# Patient Record
Sex: FEMALE | Race: BLACK OR AFRICAN AMERICAN | Employment: OTHER
[De-identification: names, ages, dates, MRNs, and addresses within clinical notes are randomized per-mention and may not be internally consistent; named-entity substitution may affect disease eponyms.]

---

## 2017-01-12 ENCOUNTER — OFFICE VISIT (OUTPATIENT)
Dept: FAMILY MEDICINE CLINIC | Facility: CLINIC | Age: 72
End: 2017-01-12

## 2017-01-12 VITALS
HEIGHT: 63 IN | WEIGHT: 152.4 LBS | HEART RATE: 89 BPM | DIASTOLIC BLOOD PRESSURE: 93 MMHG | SYSTOLIC BLOOD PRESSURE: 165 MMHG | TEMPERATURE: 97.1 F | BODY MASS INDEX: 27 KG/M2

## 2017-01-12 DIAGNOSIS — I10 ESSENTIAL HYPERTENSION: ICD-10-CM

## 2017-01-12 DIAGNOSIS — E78.2 MIXED HYPERLIPIDEMIA: ICD-10-CM

## 2017-01-12 DIAGNOSIS — E11.65 TYPE 2 DIABETES MELLITUS WITH HYPERGLYCEMIA, WITHOUT LONG-TERM CURRENT USE OF INSULIN (HCC): Primary | ICD-10-CM

## 2017-01-12 PROCEDURE — 99214 OFFICE O/P EST MOD 30 MIN: CPT | Performed by: FAMILY MEDICINE

## 2017-01-12 RX ORDER — GLIPIZIDE 10 MG/1
10 TABLET ORAL
Qty: 60 TABLET | Refills: 3 | Status: ON HOLD | OUTPATIENT
Start: 2017-01-12 | End: 2018-01-01

## 2017-01-12 RX ORDER — LISINOPRIL 40 MG/1
40 TABLET ORAL DAILY
Qty: 30 TABLET | Refills: 3 | Status: ON HOLD | OUTPATIENT
Start: 2017-01-12 | End: 2018-01-01

## 2017-01-12 RX ORDER — ASPIRIN 81 MG/1
81 TABLET ORAL DAILY
Qty: 30 TABLET | Refills: 3 | Status: ON HOLD | OUTPATIENT
Start: 2017-01-12 | End: 2018-01-01

## 2017-01-12 RX ORDER — HYDROCHLOROTHIAZIDE 25 MG/1
25 TABLET ORAL DAILY
Qty: 30 TABLET | Refills: 3 | Status: ON HOLD | OUTPATIENT
Start: 2017-01-12 | End: 2018-01-01

## 2017-01-12 RX ORDER — ATORVASTATIN CALCIUM 40 MG/1
40 TABLET, FILM COATED ORAL DAILY
Qty: 30 TABLET | Refills: 3 | Status: ON HOLD | OUTPATIENT
Start: 2017-01-12 | End: 2018-01-01

## 2017-01-12 RX ORDER — LABETALOL 300 MG/1
300 TABLET, FILM COATED ORAL 2 TIMES DAILY
Qty: 60 TABLET | Refills: 3 | Status: ON HOLD | OUTPATIENT
Start: 2017-01-12 | End: 2018-01-01

## 2017-01-12 RX ORDER — VERAPAMIL HYDROCHLORIDE 240 MG/1
240 TABLET, FILM COATED, EXTENDED RELEASE ORAL 2 TIMES DAILY
Qty: 60 TABLET | Refills: 3 | Status: ON HOLD | OUTPATIENT
Start: 2017-01-12 | End: 2018-01-01

## 2017-01-30 ASSESSMENT — ENCOUNTER SYMPTOMS
ABDOMINAL PAIN: 0
SHORTNESS OF BREATH: 0
BACK PAIN: 0

## 2017-02-22 ENCOUNTER — OFFICE VISIT (OUTPATIENT)
Dept: FAMILY MEDICINE CLINIC | Facility: CLINIC | Age: 72
End: 2017-02-22

## 2017-02-22 DIAGNOSIS — Z23 ENCOUNTER FOR IMMUNIZATION: ICD-10-CM

## 2017-02-22 DIAGNOSIS — E55.9 VITAMIN D DEFICIENCY: ICD-10-CM

## 2017-02-22 DIAGNOSIS — Z13.1 DM (DIABETES MELLITUS SCREEN): ICD-10-CM

## 2017-02-22 DIAGNOSIS — Z00.00 HEALTHCARE MAINTENANCE: Primary | ICD-10-CM

## 2017-02-22 LAB — HBA1C MFR BLD: 9.1 %

## 2017-02-22 PROCEDURE — 99213 OFFICE O/P EST LOW 20 MIN: CPT | Performed by: HOSPITALIST

## 2017-02-22 PROCEDURE — G0008 ADMIN INFLUENZA VIRUS VAC: HCPCS | Performed by: HOSPITALIST

## 2017-02-22 PROCEDURE — 83036 HEMOGLOBIN GLYCOSYLATED A1C: CPT | Performed by: HOSPITALIST

## 2017-02-22 PROCEDURE — 90688 IIV4 VACCINE SPLT 0.5 ML IM: CPT | Performed by: HOSPITALIST

## 2017-02-22 ASSESSMENT — ENCOUNTER SYMPTOMS
BLURRED VISION: 0
EYE PAIN: 0
EYE DISCHARGE: 0

## 2017-02-23 VITALS
WEIGHT: 151.8 LBS | SYSTOLIC BLOOD PRESSURE: 163 MMHG | BODY MASS INDEX: 26.9 KG/M2 | TEMPERATURE: 97.9 F | DIASTOLIC BLOOD PRESSURE: 80 MMHG | HEIGHT: 63 IN | HEART RATE: 86 BPM

## 2018-01-01 ENCOUNTER — TELEPHONE (OUTPATIENT)
Dept: ONCOLOGY | Age: 73
End: 2018-01-01

## 2018-01-01 ENCOUNTER — APPOINTMENT (OUTPATIENT)
Dept: CT IMAGING | Age: 73
DRG: 746 | End: 2018-01-01
Payer: MEDICARE

## 2018-01-01 ENCOUNTER — APPOINTMENT (OUTPATIENT)
Dept: GENERAL RADIOLOGY | Age: 73
DRG: 754 | End: 2018-01-01
Payer: MEDICARE

## 2018-01-01 ENCOUNTER — HOSPITAL ENCOUNTER (OUTPATIENT)
Facility: MEDICAL CENTER | Age: 73
Discharge: HOME | End: 2018-07-02
Payer: MEDICARE

## 2018-01-01 ENCOUNTER — HOSPITAL ENCOUNTER (INPATIENT)
Age: 73
LOS: 1 days | Discharge: HOSPICE | DRG: 951 | End: 2018-07-13
Attending: INTERNAL MEDICINE | Admitting: INTERNAL MEDICINE
Payer: COMMERCIAL

## 2018-01-01 ENCOUNTER — APPOINTMENT (OUTPATIENT)
Dept: ULTRASOUND IMAGING | Age: 73
DRG: 746 | End: 2018-01-01
Payer: MEDICARE

## 2018-01-01 ENCOUNTER — HOSPITAL ENCOUNTER (INPATIENT)
Age: 73
LOS: 11 days | Discharge: HOME | DRG: 746 | End: 2018-06-27
Attending: EMERGENCY MEDICINE | Admitting: INTERNAL MEDICINE
Payer: MEDICARE

## 2018-01-01 ENCOUNTER — SURG/PROC ORDERS (OUTPATIENT)
Dept: GYNECOLOGIC ONCOLOGY | Age: 73
End: 2018-01-01

## 2018-01-01 ENCOUNTER — HOSPITAL ENCOUNTER (INPATIENT)
Age: 73
LOS: 3 days | Discharge: HOSPICE | DRG: 754 | End: 2018-07-12
Attending: EMERGENCY MEDICINE | Admitting: INTERNAL MEDICINE
Payer: MEDICARE

## 2018-01-01 ENCOUNTER — HOSPITAL ENCOUNTER (OUTPATIENT)
Dept: NUCLEAR MEDICINE | Age: 73
Discharge: HOME | End: 2018-07-04
Payer: MEDICARE

## 2018-01-01 ENCOUNTER — APPOINTMENT (OUTPATIENT)
Dept: MRI IMAGING | Age: 73
DRG: 746 | End: 2018-01-01
Payer: MEDICARE

## 2018-01-01 ENCOUNTER — HOSPITAL ENCOUNTER (OUTPATIENT)
Facility: MEDICAL CENTER | Age: 73
End: 2018-01-01
Payer: MEDICARE

## 2018-01-01 ENCOUNTER — ANESTHESIA (OUTPATIENT)
Dept: OPERATING ROOM | Age: 73
DRG: 746 | End: 2018-01-01
Payer: MEDICARE

## 2018-01-01 ENCOUNTER — ANESTHESIA EVENT (OUTPATIENT)
Dept: OPERATING ROOM | Age: 73
DRG: 746 | End: 2018-01-01
Payer: MEDICARE

## 2018-01-01 ENCOUNTER — APPOINTMENT (OUTPATIENT)
Dept: GENERAL RADIOLOGY | Age: 73
DRG: 746 | End: 2018-01-01
Payer: MEDICARE

## 2018-01-01 ENCOUNTER — APPOINTMENT (OUTPATIENT)
Dept: CT IMAGING | Age: 73
DRG: 754 | End: 2018-01-01
Payer: MEDICARE

## 2018-01-01 ENCOUNTER — HOSPITAL ENCOUNTER (OUTPATIENT)
Dept: NUCLEAR MEDICINE | Age: 73
Discharge: HOME | End: 2018-07-08
Payer: MEDICARE

## 2018-01-01 ENCOUNTER — TELEPHONE (OUTPATIENT)
Dept: INFUSION THERAPY | Facility: MEDICAL CENTER | Age: 73
End: 2018-01-01

## 2018-01-01 ENCOUNTER — HOSPITAL ENCOUNTER (OUTPATIENT)
Dept: RADIATION ONCOLOGY | Facility: MEDICAL CENTER | Age: 73
Discharge: HOME | End: 2018-06-29
Payer: MEDICARE

## 2018-01-01 VITALS
DIASTOLIC BLOOD PRESSURE: 67 MMHG | HEART RATE: 112 BPM | BODY MASS INDEX: 23.91 KG/M2 | OXYGEN SATURATION: 97 % | WEIGHT: 134.92 LBS | RESPIRATION RATE: 22 BRPM | HEIGHT: 63 IN | SYSTOLIC BLOOD PRESSURE: 138 MMHG | TEMPERATURE: 98.8 F

## 2018-01-01 VITALS
HEIGHT: 62 IN | DIASTOLIC BLOOD PRESSURE: 46 MMHG | WEIGHT: 122.2 LBS | OXYGEN SATURATION: 95 % | SYSTOLIC BLOOD PRESSURE: 92 MMHG | TEMPERATURE: 98.5 F | BODY MASS INDEX: 22.49 KG/M2 | HEART RATE: 61 BPM | RESPIRATION RATE: 16 BRPM

## 2018-01-01 VITALS
OXYGEN SATURATION: 89 % | SYSTOLIC BLOOD PRESSURE: 158 MMHG | TEMPERATURE: 97.9 F | DIASTOLIC BLOOD PRESSURE: 78 MMHG | RESPIRATION RATE: 14 BRPM | HEART RATE: 114 BPM

## 2018-01-01 VITALS — DIASTOLIC BLOOD PRESSURE: 61 MMHG | SYSTOLIC BLOOD PRESSURE: 133 MMHG | OXYGEN SATURATION: 100 % | TEMPERATURE: 97.7 F

## 2018-01-01 DIAGNOSIS — R19.00 PELVIC MASS: ICD-10-CM

## 2018-01-01 DIAGNOSIS — C57.8 MALIGNANT NEOPLASM OF OVERLAPPING SITES OF FEMALE GENITAL ORGANS (HCC): ICD-10-CM

## 2018-01-01 DIAGNOSIS — R10.30 LOWER ABDOMINAL PAIN: Primary | ICD-10-CM

## 2018-01-01 DIAGNOSIS — D49.59 UTERINE NEOPLASM: ICD-10-CM

## 2018-01-01 DIAGNOSIS — D64.9 LOW HEMOGLOBIN: Primary | ICD-10-CM

## 2018-01-01 DIAGNOSIS — N73.9 PELVIC ABSCESS IN FEMALE: ICD-10-CM

## 2018-01-01 DIAGNOSIS — R53.81 PHYSICAL DECONDITIONING: ICD-10-CM

## 2018-01-01 DIAGNOSIS — R19.00 ABDOMINAL MASS, UNSPECIFIED ABDOMINAL LOCATION: ICD-10-CM

## 2018-01-01 DIAGNOSIS — N30.00 ACUTE CYSTITIS WITHOUT HEMATURIA: ICD-10-CM

## 2018-01-01 DIAGNOSIS — E87.1 HYPONATREMIA: ICD-10-CM

## 2018-01-01 DIAGNOSIS — D49.59 UTERINE NEOPLASM: Primary | ICD-10-CM

## 2018-01-01 DIAGNOSIS — N93.9 VAGINAL BLEEDING: ICD-10-CM

## 2018-01-01 LAB
-: ABNORMAL
ABO/RH: NORMAL
ABSOLUTE EOS #: 0 K/UL (ref 0–0.4)
ABSOLUTE EOS #: 0 K/UL (ref 0–0.44)
ABSOLUTE EOS #: 0.03 K/UL (ref 0–0.44)
ABSOLUTE EOS #: 0.05 K/UL (ref 0–0.44)
ABSOLUTE EOS #: 0.09 K/UL (ref 0–0.44)
ABSOLUTE EOS #: <0.03 K/UL (ref 0–0.44)
ABSOLUTE IMMATURE GRANULOCYTE: 0 K/UL (ref 0–0.3)
ABSOLUTE IMMATURE GRANULOCYTE: 0.1 K/UL (ref 0–0.3)
ABSOLUTE IMMATURE GRANULOCYTE: 0.11 K/UL (ref 0–0.3)
ABSOLUTE IMMATURE GRANULOCYTE: 0.16 K/UL (ref 0–0.3)
ABSOLUTE IMMATURE GRANULOCYTE: 0.16 K/UL (ref 0–0.3)
ABSOLUTE IMMATURE GRANULOCYTE: 0.17 K/UL (ref 0–0.3)
ABSOLUTE IMMATURE GRANULOCYTE: 0.19 K/UL (ref 0–0.3)
ABSOLUTE IMMATURE GRANULOCYTE: 0.29 K/UL (ref 0–0.3)
ABSOLUTE IMMATURE GRANULOCYTE: 0.55 K/UL (ref 0–0.3)
ABSOLUTE IMMATURE GRANULOCYTE: 0.69 K/UL (ref 0–0.3)
ABSOLUTE LYMPH #: 1.35 K/UL (ref 1.1–3.7)
ABSOLUTE LYMPH #: 1.46 K/UL (ref 1.1–3.7)
ABSOLUTE LYMPH #: 1.6 K/UL (ref 1.1–3.7)
ABSOLUTE LYMPH #: 1.72 K/UL (ref 1–4.8)
ABSOLUTE LYMPH #: 2.33 K/UL (ref 1.1–3.7)
ABSOLUTE LYMPH #: 2.4 K/UL (ref 1.1–3.7)
ABSOLUTE LYMPH #: 2.4 K/UL (ref 1.1–3.7)
ABSOLUTE LYMPH #: 2.84 K/UL (ref 1.1–3.7)
ABSOLUTE LYMPH #: 4.41 K/UL (ref 1–4.8)
ABSOLUTE LYMPH #: 4.93 K/UL (ref 1–4.8)
ABSOLUTE MONO #: 0.52 K/UL (ref 0.1–0.8)
ABSOLUTE MONO #: 0.57 K/UL (ref 0.1–1.2)
ABSOLUTE MONO #: 0.59 K/UL (ref 0.1–0.8)
ABSOLUTE MONO #: 0.69 K/UL (ref 0.1–1.2)
ABSOLUTE MONO #: 0.82 K/UL (ref 0.1–0.8)
ABSOLUTE MONO #: 1.04 K/UL (ref 0.1–1.2)
ABSOLUTE MONO #: 1.09 K/UL (ref 0.1–1.2)
ABSOLUTE MONO #: 1.12 K/UL (ref 0.1–1.2)
ABSOLUTE MONO #: 1.15 K/UL (ref 0.1–1.2)
ABSOLUTE MONO #: 1.42 K/UL (ref 0.1–1.2)
ACTION: NORMAL
ALBUMIN SERPL-MCNC: 1.6 G/DL (ref 3.5–5.2)
ALBUMIN SERPL-MCNC: 2 G/DL (ref 3.5–5.2)
ALBUMIN SERPL-MCNC: 2.4 G/DL (ref 3.5–5.2)
ALBUMIN/GLOBULIN RATIO: 0.4 (ref 1–2.5)
ALBUMIN/GLOBULIN RATIO: 0.4 (ref 1–2.5)
ALBUMIN/GLOBULIN RATIO: 0.5 (ref 1–2.5)
ALLEN TEST: ABNORMAL
ALLEN TEST: ABNORMAL
ALP BLD-CCNC: 142 U/L (ref 35–104)
ALP BLD-CCNC: 168 U/L (ref 35–104)
ALP BLD-CCNC: 264 U/L (ref 35–104)
ALT SERPL-CCNC: 10 U/L (ref 5–33)
ALT SERPL-CCNC: 33 U/L (ref 5–33)
ALT SERPL-CCNC: 9 U/L (ref 5–33)
AMORPHOUS: ABNORMAL
ANION GAP SERPL CALCULATED.3IONS-SCNC: 10 MMOL/L (ref 9–17)
ANION GAP SERPL CALCULATED.3IONS-SCNC: 11 MMOL/L (ref 9–17)
ANION GAP SERPL CALCULATED.3IONS-SCNC: 13 MMOL/L (ref 9–17)
ANION GAP SERPL CALCULATED.3IONS-SCNC: 14 MMOL/L (ref 9–17)
ANION GAP SERPL CALCULATED.3IONS-SCNC: 15 MMOL/L (ref 9–17)
ANION GAP SERPL CALCULATED.3IONS-SCNC: 17 MMOL/L (ref 9–17)
ANION GAP SERPL CALCULATED.3IONS-SCNC: 8 MMOL/L (ref 9–17)
ANION GAP SERPL CALCULATED.3IONS-SCNC: 9 MMOL/L (ref 9–17)
ANION GAP: 14 MMOL/L (ref 7–16)
ANTIBODY SCREEN: NEGATIVE
ARM BAND NUMBER: NORMAL
AST SERPL-CCNC: 16 U/L
AST SERPL-CCNC: 18 U/L
AST SERPL-CCNC: 77 U/L
BACTERIA: ABNORMAL
BASOPHILS # BLD: 0 % (ref 0–2)
BASOPHILS ABSOLUTE: 0 K/UL (ref 0–0.2)
BASOPHILS ABSOLUTE: 0.04 K/UL (ref 0–0.2)
BASOPHILS ABSOLUTE: 0.04 K/UL (ref 0–0.2)
BASOPHILS ABSOLUTE: 0.05 K/UL (ref 0–0.2)
BASOPHILS ABSOLUTE: <0.03 K/UL (ref 0–0.2)
BILIRUB SERPL-MCNC: 0.43 MG/DL (ref 0.3–1.2)
BILIRUB SERPL-MCNC: 0.49 MG/DL (ref 0.3–1.2)
BILIRUB SERPL-MCNC: 0.85 MG/DL (ref 0.3–1.2)
BILIRUBIN URINE: NEGATIVE
BILIRUBIN URINE: NEGATIVE
BLD PROD TYP BPU: NORMAL
BLOOD BANK SPECIMEN: NORMAL
BLOOD BANK SPECIMEN: NORMAL
BUN BLDV-MCNC: 11 MG/DL (ref 8–23)
BUN BLDV-MCNC: 11 MG/DL (ref 8–23)
BUN BLDV-MCNC: 12 MG/DL (ref 8–23)
BUN BLDV-MCNC: 13 MG/DL (ref 8–23)
BUN BLDV-MCNC: 15 MG/DL (ref 8–23)
BUN BLDV-MCNC: 15 MG/DL (ref 8–23)
BUN BLDV-MCNC: 4 MG/DL (ref 8–23)
BUN BLDV-MCNC: 6 MG/DL (ref 8–23)
BUN BLDV-MCNC: 7 MG/DL (ref 8–23)
BUN BLDV-MCNC: 8 MG/DL (ref 8–23)
BUN BLDV-MCNC: 9 MG/DL (ref 8–23)
BUN BLDV-MCNC: 9 MG/DL (ref 8–23)
BUN/CREAT BLD: ABNORMAL (ref 9–20)
C TRACH DNA GENITAL QL NAA+PROBE: NEGATIVE
CA 125: 43 U/ML
CA 19-9: 121 U/ML (ref 0–35)
CALCIUM SERPL-MCNC: 7.7 MG/DL (ref 8.6–10.4)
CALCIUM SERPL-MCNC: 7.8 MG/DL (ref 8.6–10.4)
CALCIUM SERPL-MCNC: 7.9 MG/DL (ref 8.6–10.4)
CALCIUM SERPL-MCNC: 8 MG/DL (ref 8.6–10.4)
CALCIUM SERPL-MCNC: 8 MG/DL (ref 8.6–10.4)
CALCIUM SERPL-MCNC: 8.1 MG/DL (ref 8.6–10.4)
CALCIUM SERPL-MCNC: 8.2 MG/DL (ref 8.6–10.4)
CALCIUM SERPL-MCNC: 8.2 MG/DL (ref 8.6–10.4)
CALCIUM SERPL-MCNC: 8.5 MG/DL (ref 8.6–10.4)
CARCINOEMBRYONIC ANTIGEN: 3 NG/ML
CASTS UA: ABNORMAL /LPF (ref 0–8)
CHLORIDE BLD-SCNC: 100 MMOL/L (ref 98–107)
CHLORIDE BLD-SCNC: 100 MMOL/L (ref 98–107)
CHLORIDE BLD-SCNC: 101 MMOL/L (ref 98–107)
CHLORIDE BLD-SCNC: 102 MMOL/L (ref 98–107)
CHLORIDE BLD-SCNC: 102 MMOL/L (ref 98–107)
CHLORIDE BLD-SCNC: 103 MMOL/L (ref 98–107)
CHLORIDE BLD-SCNC: 104 MMOL/L (ref 98–107)
CHLORIDE BLD-SCNC: 104 MMOL/L (ref 98–107)
CHLORIDE BLD-SCNC: 105 MMOL/L (ref 98–107)
CHLORIDE BLD-SCNC: 89 MMOL/L (ref 98–107)
CHLORIDE BLD-SCNC: 96 MMOL/L (ref 98–107)
CHLORIDE BLD-SCNC: 97 MMOL/L (ref 98–107)
CHLORIDE BLD-SCNC: 98 MMOL/L (ref 98–107)
CHLORIDE BLD-SCNC: 99 MMOL/L (ref 98–107)
CO2: 18 MMOL/L (ref 20–31)
CO2: 18 MMOL/L (ref 20–31)
CO2: 19 MMOL/L (ref 20–31)
CO2: 20 MMOL/L (ref 20–31)
CO2: 21 MMOL/L (ref 20–31)
CO2: 22 MMOL/L (ref 20–31)
CO2: 23 MMOL/L (ref 20–31)
CO2: 26 MMOL/L (ref 20–31)
COLOR: YELLOW
COLOR: YELLOW
COMMENT UA: ABNORMAL
COMMENT UA: NORMAL
CREAT SERPL-MCNC: 0.41 MG/DL (ref 0.5–0.9)
CREAT SERPL-MCNC: 0.43 MG/DL (ref 0.5–0.9)
CREAT SERPL-MCNC: 0.44 MG/DL (ref 0.5–0.9)
CREAT SERPL-MCNC: 0.44 MG/DL (ref 0.5–0.9)
CREAT SERPL-MCNC: 0.45 MG/DL (ref 0.5–0.9)
CREAT SERPL-MCNC: 0.47 MG/DL (ref 0.5–0.9)
CREAT SERPL-MCNC: 0.47 MG/DL (ref 0.5–0.9)
CREAT SERPL-MCNC: 0.5 MG/DL (ref 0.5–0.9)
CREAT SERPL-MCNC: 0.5 MG/DL (ref 0.5–0.9)
CREAT SERPL-MCNC: 0.53 MG/DL (ref 0.5–0.9)
CREAT SERPL-MCNC: 0.54 MG/DL (ref 0.5–0.9)
CREAT SERPL-MCNC: 0.55 MG/DL (ref 0.5–0.9)
CREAT SERPL-MCNC: 0.56 MG/DL (ref 0.5–0.9)
CREAT SERPL-MCNC: 0.61 MG/DL (ref 0.5–0.9)
CREAT SERPL-MCNC: 0.63 MG/DL (ref 0.5–0.9)
CREAT SERPL-MCNC: 0.74 MG/DL (ref 0.5–0.9)
CROSSMATCH RESULT: NORMAL
CRYSTALS, UA: ABNORMAL /HPF
CULTURE: ABNORMAL
CULTURE: NO GROWTH
CULTURE: NORMAL
DATE AND TIME: NORMAL
DIFFERENTIAL TYPE: ABNORMAL
DIRECT EXAM: ABNORMAL
DIRECT EXAM: NORMAL
DISPENSE STATUS BLOOD BANK: NORMAL
EKG ATRIAL RATE: 115 BPM
EKG ATRIAL RATE: 72 BPM
EKG ATRIAL RATE: 88 BPM
EKG ATRIAL RATE: 97 BPM
EKG P AXIS: 23 DEGREES
EKG P AXIS: 31 DEGREES
EKG P AXIS: 65 DEGREES
EKG P AXIS: 70 DEGREES
EKG P-R INTERVAL: 134 MS
EKG P-R INTERVAL: 134 MS
EKG P-R INTERVAL: 152 MS
EKG P-R INTERVAL: 186 MS
EKG Q-T INTERVAL: 316 MS
EKG Q-T INTERVAL: 364 MS
EKG Q-T INTERVAL: 432 MS
EKG Q-T INTERVAL: 486 MS
EKG QRS DURATION: 74 MS
EKG QRS DURATION: 76 MS
EKG QRS DURATION: 76 MS
EKG QRS DURATION: 78 MS
EKG QTC CALCULATION (BAZETT): 437 MS
EKG QTC CALCULATION (BAZETT): 462 MS
EKG QTC CALCULATION (BAZETT): 522 MS
EKG QTC CALCULATION (BAZETT): 532 MS
EKG R AXIS: 23 DEGREES
EKG R AXIS: 24 DEGREES
EKG R AXIS: 5 DEGREES
EKG R AXIS: 89 DEGREES
EKG T AXIS: -115 DEGREES
EKG T AXIS: -139 DEGREES
EKG T AXIS: 33 DEGREES
EKG T AXIS: 71 DEGREES
EKG VENTRICULAR RATE: 115 BPM
EKG VENTRICULAR RATE: 72 BPM
EKG VENTRICULAR RATE: 88 BPM
EKG VENTRICULAR RATE: 97 BPM
EOSINOPHILS RELATIVE PERCENT: 0 % (ref 1–4)
EOSINOPHILS RELATIVE PERCENT: 1 % (ref 1–4)
EPITHELIAL CELLS UA: ABNORMAL /HPF (ref 0–5)
ESTIMATED AVERAGE GLUCOSE: 166 MG/DL
EXPIRATION DATE: NORMAL
FERRITIN: 338 UG/L (ref 13–150)
FIO2: 100
FIO2: 100
FOLATE: 9 NG/ML
GFR AFRICAN AMERICAN: >60 ML/MIN
GFR NON-AFRICAN AMERICAN: >60 ML/MIN
GFR SERPL CREATININE-BSD FRML MDRD: >60 ML/MIN
GFR SERPL CREATININE-BSD FRML MDRD: ABNORMAL ML/MIN/{1.73_M2}
GFR SERPL CREATININE-BSD FRML MDRD: NORMAL ML/MIN/{1.73_M2}
GLUCOSE BLD-MCNC: 104 MG/DL (ref 65–105)
GLUCOSE BLD-MCNC: 107 MG/DL (ref 65–105)
GLUCOSE BLD-MCNC: 108 MG/DL (ref 70–99)
GLUCOSE BLD-MCNC: 112 MG/DL (ref 65–105)
GLUCOSE BLD-MCNC: 113 MG/DL (ref 65–105)
GLUCOSE BLD-MCNC: 115 MG/DL (ref 65–105)
GLUCOSE BLD-MCNC: 115 MG/DL (ref 65–105)
GLUCOSE BLD-MCNC: 115 MG/DL (ref 70–99)
GLUCOSE BLD-MCNC: 119 MG/DL (ref 65–105)
GLUCOSE BLD-MCNC: 119 MG/DL (ref 65–105)
GLUCOSE BLD-MCNC: 120 MG/DL (ref 65–105)
GLUCOSE BLD-MCNC: 123 MG/DL (ref 65–105)
GLUCOSE BLD-MCNC: 124 MG/DL (ref 65–105)
GLUCOSE BLD-MCNC: 124 MG/DL (ref 65–105)
GLUCOSE BLD-MCNC: 127 MG/DL (ref 65–105)
GLUCOSE BLD-MCNC: 132 MG/DL (ref 65–105)
GLUCOSE BLD-MCNC: 136 MG/DL (ref 70–99)
GLUCOSE BLD-MCNC: 137 MG/DL (ref 65–105)
GLUCOSE BLD-MCNC: 137 MG/DL (ref 65–105)
GLUCOSE BLD-MCNC: 141 MG/DL (ref 65–105)
GLUCOSE BLD-MCNC: 141 MG/DL (ref 65–105)
GLUCOSE BLD-MCNC: 142 MG/DL (ref 65–105)
GLUCOSE BLD-MCNC: 143 MG/DL (ref 65–105)
GLUCOSE BLD-MCNC: 146 MG/DL (ref 65–105)
GLUCOSE BLD-MCNC: 147 MG/DL (ref 65–105)
GLUCOSE BLD-MCNC: 153 MG/DL (ref 65–105)
GLUCOSE BLD-MCNC: 155 MG/DL (ref 65–105)
GLUCOSE BLD-MCNC: 155 MG/DL (ref 70–99)
GLUCOSE BLD-MCNC: 161 MG/DL (ref 65–105)
GLUCOSE BLD-MCNC: 161 MG/DL (ref 65–105)
GLUCOSE BLD-MCNC: 163 MG/DL (ref 65–105)
GLUCOSE BLD-MCNC: 163 MG/DL (ref 65–105)
GLUCOSE BLD-MCNC: 167 MG/DL (ref 70–99)
GLUCOSE BLD-MCNC: 168 MG/DL (ref 65–105)
GLUCOSE BLD-MCNC: 173 MG/DL (ref 65–105)
GLUCOSE BLD-MCNC: 173 MG/DL (ref 70–99)
GLUCOSE BLD-MCNC: 175 MG/DL (ref 70–99)
GLUCOSE BLD-MCNC: 179 MG/DL (ref 70–99)
GLUCOSE BLD-MCNC: 180 MG/DL (ref 70–99)
GLUCOSE BLD-MCNC: 181 MG/DL (ref 65–105)
GLUCOSE BLD-MCNC: 182 MG/DL (ref 65–105)
GLUCOSE BLD-MCNC: 182 MG/DL (ref 70–99)
GLUCOSE BLD-MCNC: 195 MG/DL (ref 65–105)
GLUCOSE BLD-MCNC: 198 MG/DL (ref 65–105)
GLUCOSE BLD-MCNC: 199 MG/DL (ref 65–105)
GLUCOSE BLD-MCNC: 200 MG/DL (ref 65–105)
GLUCOSE BLD-MCNC: 201 MG/DL (ref 65–105)
GLUCOSE BLD-MCNC: 211 MG/DL (ref 65–105)
GLUCOSE BLD-MCNC: 213 MG/DL (ref 65–105)
GLUCOSE BLD-MCNC: 225 MG/DL (ref 70–99)
GLUCOSE BLD-MCNC: 226 MG/DL (ref 65–105)
GLUCOSE BLD-MCNC: 226 MG/DL (ref 65–105)
GLUCOSE BLD-MCNC: 228 MG/DL (ref 65–105)
GLUCOSE BLD-MCNC: 258 MG/DL (ref 65–105)
GLUCOSE BLD-MCNC: 263 MG/DL (ref 65–105)
GLUCOSE BLD-MCNC: 265 MG/DL (ref 65–105)
GLUCOSE BLD-MCNC: 265 MG/DL (ref 65–105)
GLUCOSE BLD-MCNC: 266 MG/DL (ref 65–105)
GLUCOSE BLD-MCNC: 528 MG/DL (ref 74–100)
GLUCOSE BLD-MCNC: 54 MG/DL (ref 65–105)
GLUCOSE BLD-MCNC: 57 MG/DL (ref 70–99)
GLUCOSE BLD-MCNC: 64 MG/DL (ref 65–105)
GLUCOSE BLD-MCNC: 64 MG/DL (ref 65–105)
GLUCOSE BLD-MCNC: 76 MG/DL (ref 65–105)
GLUCOSE BLD-MCNC: 76 MG/DL (ref 65–105)
GLUCOSE BLD-MCNC: 78 MG/DL (ref 65–105)
GLUCOSE BLD-MCNC: 78 MG/DL (ref 65–105)
GLUCOSE BLD-MCNC: 82 MG/DL (ref 65–105)
GLUCOSE BLD-MCNC: 82 MG/DL (ref 70–99)
GLUCOSE BLD-MCNC: 83 MG/DL (ref 65–105)
GLUCOSE BLD-MCNC: 84 MG/DL (ref 70–99)
GLUCOSE BLD-MCNC: 87 MG/DL (ref 65–105)
GLUCOSE BLD-MCNC: 89 MG/DL (ref 70–99)
GLUCOSE BLD-MCNC: 91 MG/DL (ref 65–105)
GLUCOSE BLD-MCNC: 91 MG/DL (ref 65–105)
GLUCOSE BLD-MCNC: 92 MG/DL (ref 70–99)
GLUCOSE BLD-MCNC: 94 MG/DL (ref 65–105)
GLUCOSE BLD-MCNC: 98 MG/DL (ref 65–105)
GLUCOSE URINE: NEGATIVE
GLUCOSE URINE: NEGATIVE
HBA1C MFR BLD: 7.4 % (ref 4–6)
HCT VFR BLD CALC: 20.9 % (ref 36.3–47.1)
HCT VFR BLD CALC: 22.5 % (ref 36.3–47.1)
HCT VFR BLD CALC: 24.9 % (ref 36.3–47.1)
HCT VFR BLD CALC: 25.7 % (ref 36.3–47.1)
HCT VFR BLD CALC: 27.5 % (ref 36.3–47.1)
HCT VFR BLD CALC: 29.4 % (ref 36.3–47.1)
HCT VFR BLD CALC: 29.4 % (ref 36.3–47.1)
HCT VFR BLD CALC: 30 % (ref 36.3–47.1)
HCT VFR BLD CALC: 31.3 % (ref 36.3–47.1)
HCT VFR BLD CALC: 31.6 % (ref 36.3–47.1)
HCT VFR BLD CALC: 31.8 % (ref 36.3–47.1)
HCT VFR BLD CALC: 32.4 % (ref 36.3–47.1)
HCT VFR BLD CALC: 32.6 % (ref 36.3–47.1)
HEMOGLOBIN: 10 G/DL (ref 11.9–15.1)
HEMOGLOBIN: 10.7 G/DL (ref 11.9–15.1)
HEMOGLOBIN: 11.1 G/DL (ref 11.9–15.1)
HEMOGLOBIN: 6.1 G/DL (ref 11.9–15.1)
HEMOGLOBIN: 6.8 G/DL (ref 11.9–15.1)
HEMOGLOBIN: 6.8 G/DL (ref 11.9–15.1)
HEMOGLOBIN: 7 G/DL (ref 11.9–15.1)
HEMOGLOBIN: 7.5 G/DL (ref 11.9–15.1)
HEMOGLOBIN: 7.7 G/DL (ref 11.9–15.1)
HEMOGLOBIN: 7.8 G/DL (ref 11.9–15.1)
HEMOGLOBIN: 7.9 G/DL (ref 11.9–15.1)
HEMOGLOBIN: 7.9 G/DL (ref 11.9–15.1)
HEMOGLOBIN: 8.2 G/DL (ref 11.9–15.1)
HEMOGLOBIN: 8.3 G/DL (ref 11.9–15.1)
HEMOGLOBIN: 8.3 G/DL (ref 11.9–15.1)
HEMOGLOBIN: 8.4 G/DL (ref 11.9–15.1)
HEMOGLOBIN: 8.5 G/DL (ref 11.9–15.1)
HEMOGLOBIN: 8.6 G/DL (ref 11.9–15.1)
HEMOGLOBIN: 8.7 G/DL (ref 11.9–15.1)
HEMOGLOBIN: 8.7 G/DL (ref 11.9–15.1)
HEMOGLOBIN: 8.8 G/DL (ref 11.9–15.1)
HEMOGLOBIN: 8.9 G/DL (ref 11.9–15.1)
HEMOGLOBIN: 9 G/DL (ref 11.9–15.1)
HEMOGLOBIN: 9.1 G/DL (ref 11.9–15.1)
HEMOGLOBIN: 9.1 G/DL (ref 11.9–15.1)
HEMOGLOBIN: 9.2 G/DL (ref 11.9–15.1)
HEMOGLOBIN: 9.3 G/DL (ref 11.9–15.1)
HEMOGLOBIN: 9.4 G/DL (ref 11.9–15.1)
HEMOGLOBIN: 9.5 G/DL (ref 11.9–15.1)
HEMOGLOBIN: 9.5 G/DL (ref 11.9–15.1)
HEMOGLOBIN: 9.6 G/DL (ref 11.9–15.1)
HEMOGLOBIN: 9.7 G/DL (ref 11.9–15.1)
HEMOGLOBIN: 9.9 G/DL (ref 11.9–15.1)
IMMATURE GRANULOCYTES: 0 %
IMMATURE GRANULOCYTES: 1 %
IMMATURE GRANULOCYTES: 2 %
IMMATURE GRANULOCYTES: 3 %
INR BLD: 1
INR BLD: 1.2
IRON SATURATION: 11 % (ref 20–55)
IRON: 21 UG/DL (ref 37–145)
KETONES, URINE: ABNORMAL
KETONES, URINE: NEGATIVE
LACTIC ACID, WHOLE BLOOD: 0.8 MMOL/L (ref 0.7–2.1)
LACTIC ACID, WHOLE BLOOD: 1.7 MMOL/L (ref 0.7–2.1)
LACTIC ACID, WHOLE BLOOD: 2 MMOL/L (ref 0.7–2.1)
LACTIC ACID, WHOLE BLOOD: 2.7 MMOL/L (ref 0.7–2.1)
LACTIC ACID, WHOLE BLOOD: 3.2 MMOL/L (ref 0.7–2.1)
LEUKOCYTE ESTERASE, URINE: ABNORMAL
LEUKOCYTE ESTERASE, URINE: NEGATIVE
LYMPHOCYTES # BLD: 10 % (ref 24–44)
LYMPHOCYTES # BLD: 13 % (ref 24–43)
LYMPHOCYTES # BLD: 15 % (ref 24–43)
LYMPHOCYTES # BLD: 15 % (ref 24–44)
LYMPHOCYTES # BLD: 16 % (ref 24–43)
LYMPHOCYTES # BLD: 18 % (ref 24–43)
LYMPHOCYTES # BLD: 18 % (ref 24–44)
LYMPHOCYTES # BLD: 7 % (ref 24–43)
LYMPHOCYTES # BLD: 8 % (ref 24–43)
LYMPHOCYTES # BLD: 8 % (ref 24–43)
Lab: ABNORMAL
Lab: ABNORMAL
Lab: NORMAL
MAGNESIUM: 1.7 MG/DL (ref 1.6–2.6)
MAGNESIUM: 2 MG/DL (ref 1.6–2.6)
MAGNESIUM: 2 MG/DL (ref 1.6–2.6)
MCH RBC QN AUTO: 22.1 PG (ref 25.2–33.5)
MCH RBC QN AUTO: 23.4 PG (ref 25.2–33.5)
MCH RBC QN AUTO: 24.3 PG (ref 25.2–33.5)
MCH RBC QN AUTO: 24.4 PG (ref 25.2–33.5)
MCH RBC QN AUTO: 24.5 PG (ref 25.2–33.5)
MCH RBC QN AUTO: 24.6 PG (ref 25.2–33.5)
MCH RBC QN AUTO: 24.6 PG (ref 25.2–33.5)
MCH RBC QN AUTO: 24.7 PG (ref 25.2–33.5)
MCH RBC QN AUTO: 25 PG (ref 25.2–33.5)
MCH RBC QN AUTO: 25.1 PG (ref 25.2–33.5)
MCH RBC QN AUTO: 25.3 PG (ref 25.2–33.5)
MCH RBC QN AUTO: 26.1 PG (ref 25.2–33.5)
MCHC RBC AUTO-ENTMCNC: 29.2 G/DL (ref 28.4–34.8)
MCHC RBC AUTO-ENTMCNC: 29.6 G/DL (ref 28.4–34.8)
MCHC RBC AUTO-ENTMCNC: 29.6 G/DL (ref 28.4–34.8)
MCHC RBC AUTO-ENTMCNC: 29.8 G/DL (ref 28.4–34.8)
MCHC RBC AUTO-ENTMCNC: 29.8 G/DL (ref 28.4–34.8)
MCHC RBC AUTO-ENTMCNC: 30 G/DL (ref 28.4–34.8)
MCHC RBC AUTO-ENTMCNC: 30.1 G/DL (ref 28.4–34.8)
MCHC RBC AUTO-ENTMCNC: 30.1 G/DL (ref 28.4–34.8)
MCHC RBC AUTO-ENTMCNC: 30.4 G/DL (ref 28.4–34.8)
MCHC RBC AUTO-ENTMCNC: 30.5 G/DL (ref 28.4–34.8)
MCHC RBC AUTO-ENTMCNC: 31 G/DL (ref 28.4–34.8)
MCHC RBC AUTO-ENTMCNC: 31.6 G/DL (ref 28.4–34.8)
MCV RBC AUTO: 75.7 FL (ref 82.6–102.9)
MCV RBC AUTO: 78.6 FL (ref 82.6–102.9)
MCV RBC AUTO: 79 FL (ref 82.6–102.9)
MCV RBC AUTO: 80.7 FL (ref 82.6–102.9)
MCV RBC AUTO: 81 FL (ref 82.6–102.9)
MCV RBC AUTO: 81.1 FL (ref 82.6–102.9)
MCV RBC AUTO: 81.7 FL (ref 82.6–102.9)
MCV RBC AUTO: 82.2 FL (ref 82.6–102.9)
MCV RBC AUTO: 82.4 FL (ref 82.6–102.9)
MCV RBC AUTO: 83.8 FL (ref 82.6–102.9)
MCV RBC AUTO: 84.4 FL (ref 82.6–102.9)
MCV RBC AUTO: 84.7 FL (ref 82.6–102.9)
MODE: ABNORMAL
MODE: ABNORMAL
MONOCYTES # BLD: 2 % (ref 1–7)
MONOCYTES # BLD: 3 % (ref 1–7)
MONOCYTES # BLD: 3 % (ref 1–7)
MONOCYTES # BLD: 3 % (ref 3–12)
MONOCYTES # BLD: 4 % (ref 3–12)
MONOCYTES # BLD: 6 % (ref 3–12)
MONOCYTES # BLD: 7 % (ref 3–12)
MONOCYTES # BLD: 9 % (ref 3–12)
MORPHOLOGY: ABNORMAL
MRSA, DNA, NASAL: NORMAL
MUCUS: ABNORMAL
N. GONORRHOEAE DNA: NEGATIVE
NEGATIVE BASE EXCESS, ART: 2 (ref 0–2)
NEGATIVE BASE EXCESS, ART: 4 (ref 0–2)
NITRITE, URINE: NEGATIVE
NITRITE, URINE: POSITIVE
NOTIFY: NORMAL
NRBC AUTOMATED: 0 PER 100 WBC
NRBC AUTOMATED: 0.1 PER 100 WBC
O2 DEVICE/FLOW/%: ABNORMAL
O2 DEVICE/FLOW/%: ABNORMAL
ORGANISM: ABNORMAL
OTHER OBSERVATIONS UA: ABNORMAL
PARTIAL THROMBOPLASTIN TIME: 19.8 SEC (ref 20.5–30.5)
PATIENT TEMP: ABNORMAL
PATIENT TEMP: ABNORMAL
PDW BLD-RTO: 16.3 % (ref 11.8–14.4)
PDW BLD-RTO: 17 % (ref 11.8–14.4)
PDW BLD-RTO: 18 % (ref 11.8–14.4)
PDW BLD-RTO: 18.1 % (ref 11.8–14.4)
PDW BLD-RTO: 18.1 % (ref 11.8–14.4)
PDW BLD-RTO: 18.3 % (ref 11.8–14.4)
PDW BLD-RTO: 18.6 % (ref 11.8–14.4)
PDW BLD-RTO: 18.6 % (ref 11.8–14.4)
PDW BLD-RTO: 18.9 % (ref 11.8–14.4)
PDW BLD-RTO: 19 % (ref 11.8–14.4)
PDW BLD-RTO: 19 % (ref 11.8–14.4)
PDW BLD-RTO: 20 % (ref 11.8–14.4)
PH UA: 5 (ref 5–8)
PH UA: 5.5 (ref 5–8)
PHOSPHORUS: 3.8 MG/DL (ref 2.6–4.5)
PLATELET # BLD: 315 K/UL (ref 138–453)
PLATELET # BLD: 361 K/UL (ref 138–453)
PLATELET # BLD: 373 K/UL (ref 138–453)
PLATELET # BLD: 456 K/UL (ref 138–453)
PLATELET # BLD: 487 K/UL (ref 138–453)
PLATELET # BLD: 571 K/UL (ref 138–453)
PLATELET # BLD: 632 K/UL (ref 138–453)
PLATELET # BLD: 658 K/UL (ref 138–453)
PLATELET # BLD: 659 K/UL (ref 138–453)
PLATELET # BLD: 661 K/UL (ref 138–453)
PLATELET # BLD: 690 K/UL (ref 138–453)
PLATELET # BLD: 741 K/UL (ref 138–453)
PLATELET ESTIMATE: ABNORMAL
PMV BLD AUTO: 10.2 FL (ref 8.1–13.5)
PMV BLD AUTO: 10.2 FL (ref 8.1–13.5)
PMV BLD AUTO: 10.3 FL (ref 8.1–13.5)
PMV BLD AUTO: 8.5 FL (ref 8.1–13.5)
PMV BLD AUTO: 8.9 FL (ref 8.1–13.5)
PMV BLD AUTO: 8.9 FL (ref 8.1–13.5)
PMV BLD AUTO: 9 FL (ref 8.1–13.5)
PMV BLD AUTO: 9 FL (ref 8.1–13.5)
PMV BLD AUTO: 9.2 FL (ref 8.1–13.5)
PMV BLD AUTO: 9.3 FL (ref 8.1–13.5)
POC CHLORIDE: 102 MMOL/L (ref 98–107)
POC CREATININE: 0.67 MG/DL (ref 0.51–1.19)
POC HCO3: 20 MMOL/L (ref 21–28)
POC HCO3: 24.6 MMOL/L (ref 21–28)
POC HEMATOCRIT: 18 % (ref 36–46)
POC HEMOGLOBIN: 6.1 G/DL (ref 12–16)
POC IONIZED CALCIUM: 1.34 MMOL/L (ref 1.15–1.33)
POC LACTIC ACID: 5.86 MMOL/L (ref 0.56–1.39)
POC O2 SATURATION: 98 % (ref 94–98)
POC O2 SATURATION: 99 % (ref 94–98)
POC PCO2 TEMP: ABNORMAL MM HG
POC PCO2 TEMP: ABNORMAL MM HG
POC PCO2: 32.6 MM HG (ref 35–48)
POC PCO2: 50 MM HG (ref 35–48)
POC PH TEMP: ABNORMAL
POC PH TEMP: ABNORMAL
POC PH: 7.3 (ref 7.35–7.45)
POC PH: 7.39 (ref 7.35–7.45)
POC PO2 TEMP: ABNORMAL MM HG
POC PO2 TEMP: ABNORMAL MM HG
POC PO2: 118.5 MM HG (ref 83–108)
POC PO2: 123.4 MM HG (ref 83–108)
POC POTASSIUM: 4.2 MMOL/L (ref 3.5–4.5)
POC SODIUM: 136 MMOL/L (ref 138–146)
POC TROPONIN I: 0.03 NG/ML (ref 0–0.1)
POC TROPONIN INTERP: NORMAL
POSITIVE BASE EXCESS, ART: ABNORMAL (ref 0–3)
POSITIVE BASE EXCESS, ART: ABNORMAL (ref 0–3)
POTASSIUM SERPL-SCNC: 3.4 MMOL/L (ref 3.7–5.3)
POTASSIUM SERPL-SCNC: 3.5 MMOL/L (ref 3.7–5.3)
POTASSIUM SERPL-SCNC: 3.8 MMOL/L (ref 3.7–5.3)
POTASSIUM SERPL-SCNC: 3.8 MMOL/L (ref 3.7–5.3)
POTASSIUM SERPL-SCNC: 3.9 MMOL/L (ref 3.7–5.3)
POTASSIUM SERPL-SCNC: 4 MMOL/L (ref 3.7–5.3)
POTASSIUM SERPL-SCNC: 4.1 MMOL/L (ref 3.7–5.3)
POTASSIUM SERPL-SCNC: 4.2 MMOL/L (ref 3.7–5.3)
POTASSIUM SERPL-SCNC: 4.2 MMOL/L (ref 3.7–5.3)
POTASSIUM SERPL-SCNC: 4.3 MMOL/L (ref 3.7–5.3)
POTASSIUM SERPL-SCNC: 4.3 MMOL/L (ref 3.7–5.3)
POTASSIUM SERPL-SCNC: 4.4 MMOL/L (ref 3.7–5.3)
POTASSIUM SERPL-SCNC: 4.5 MMOL/L (ref 3.7–5.3)
POTASSIUM SERPL-SCNC: 5.7 MMOL/L (ref 3.7–5.3)
PROTEIN UA: ABNORMAL
PROTEIN UA: NEGATIVE
PROTHROMBIN TIME: 11 SEC (ref 9–12)
PROTHROMBIN TIME: 12.3 SEC (ref 9–12)
RBC # BLD: 2.76 M/UL (ref 3.95–5.11)
RBC # BLD: 2.97 M/UL (ref 3.95–5.11)
RBC # BLD: 3.17 M/UL (ref 3.95–5.11)
RBC # BLD: 3.47 M/UL (ref 3.95–5.11)
RBC # BLD: 3.5 M/UL (ref 3.95–5.11)
RBC # BLD: 3.65 M/UL (ref 3.95–5.11)
RBC # BLD: 3.72 M/UL (ref 3.95–5.11)
RBC # BLD: 3.8 M/UL (ref 3.95–5.11)
RBC # BLD: 3.84 M/UL (ref 3.95–5.11)
RBC # BLD: 3.9 M/UL (ref 3.95–5.11)
RBC # BLD: 3.94 M/UL (ref 3.95–5.11)
RBC # BLD: 3.99 M/UL (ref 3.95–5.11)
RBC # BLD: ABNORMAL 10*6/UL
RBC UA: ABNORMAL /HPF (ref 0–4)
READ BACK: YES
RENAL EPITHELIAL, UA: ABNORMAL /HPF
SAMPLE SITE: ABNORMAL
SAMPLE SITE: ABNORMAL
SEG NEUTROPHILS: 72 % (ref 36–65)
SEG NEUTROPHILS: 75 % (ref 36–65)
SEG NEUTROPHILS: 77 % (ref 36–65)
SEG NEUTROPHILS: 77 % (ref 36–66)
SEG NEUTROPHILS: 79 % (ref 36–65)
SEG NEUTROPHILS: 82 % (ref 36–66)
SEG NEUTROPHILS: 86 % (ref 36–65)
SEG NEUTROPHILS: 87 % (ref 36–65)
SEG NEUTROPHILS: 87 % (ref 36–66)
SEG NEUTROPHILS: 88 % (ref 36–65)
SEGMENTED NEUTROPHILS ABSOLUTE COUNT: 11.34 K/UL (ref 1.5–8.1)
SEGMENTED NEUTROPHILS ABSOLUTE COUNT: 11.38 K/UL (ref 1.5–8.1)
SEGMENTED NEUTROPHILS ABSOLUTE COUNT: 12.32 K/UL (ref 1.5–8.1)
SEGMENTED NEUTROPHILS ABSOLUTE COUNT: 13.88 K/UL (ref 1.5–8.1)
SEGMENTED NEUTROPHILS ABSOLUTE COUNT: 14.46 K/UL (ref 1.5–8.1)
SEGMENTED NEUTROPHILS ABSOLUTE COUNT: 14.96 K/UL (ref 1.8–7.7)
SEGMENTED NEUTROPHILS ABSOLUTE COUNT: 15.81 K/UL (ref 1.5–8.1)
SEGMENTED NEUTROPHILS ABSOLUTE COUNT: 19.92 K/UL (ref 1.5–8.1)
SEGMENTED NEUTROPHILS ABSOLUTE COUNT: 21.1 K/UL (ref 1.8–7.7)
SEGMENTED NEUTROPHILS ABSOLUTE COUNT: 24.11 K/UL (ref 1.8–7.7)
SODIUM BLD-SCNC: 123 MMOL/L (ref 135–144)
SODIUM BLD-SCNC: 129 MMOL/L (ref 135–144)
SODIUM BLD-SCNC: 130 MMOL/L (ref 135–144)
SODIUM BLD-SCNC: 130 MMOL/L (ref 135–144)
SODIUM BLD-SCNC: 131 MMOL/L (ref 135–144)
SODIUM BLD-SCNC: 132 MMOL/L (ref 135–144)
SODIUM BLD-SCNC: 132 MMOL/L (ref 135–144)
SODIUM BLD-SCNC: 133 MMOL/L (ref 135–144)
SODIUM BLD-SCNC: 133 MMOL/L (ref 135–144)
SODIUM BLD-SCNC: 134 MMOL/L (ref 135–144)
SODIUM BLD-SCNC: 135 MMOL/L (ref 135–144)
SODIUM BLD-SCNC: 135 MMOL/L (ref 135–144)
SODIUM BLD-SCNC: 136 MMOL/L (ref 135–144)
SPECIFIC GRAVITY UA: 1.01 (ref 1–1.03)
SPECIFIC GRAVITY UA: 1.01 (ref 1–1.03)
SPECIMEN DESCRIPTION: ABNORMAL
SPECIMEN DESCRIPTION: ABNORMAL
SPECIMEN DESCRIPTION: NORMAL
STATUS: ABNORMAL
STATUS: ABNORMAL
STATUS: NORMAL
SURGICAL PATHOLOGY REPORT: NORMAL
TCO2 (CALC), ART: 21 MMOL/L (ref 22–29)
TCO2 (CALC), ART: 26 MMOL/L (ref 22–29)
TOTAL IRON BINDING CAPACITY: 189 UG/DL (ref 250–450)
TOTAL PROTEIN: 6.1 G/DL (ref 6.4–8.3)
TOTAL PROTEIN: 6.7 G/DL (ref 6.4–8.3)
TOTAL PROTEIN: 7.7 G/DL (ref 6.4–8.3)
TRANSFUSION STATUS: NORMAL
TRICHOMONAS: ABNORMAL
TROPONIN INTERP: ABNORMAL
TROPONIN INTERP: NORMAL
TROPONIN INTERP: NORMAL
TROPONIN T: 0.04 NG/ML
TROPONIN T: 0.07 NG/ML
TROPONIN T: 0.09 NG/ML
TROPONIN T: <0.03 NG/ML
TROPONIN T: <0.03 NG/ML
TSH SERPL DL<=0.05 MIU/L-ACNC: 2.71 MIU/L (ref 0.3–5)
TURBIDITY: ABNORMAL
TURBIDITY: CLEAR
UNIT DIVISION: 0
UNIT NUMBER: NORMAL
UNSATURATED IRON BINDING CAPACITY: 168 UG/DL (ref 112–347)
URINE HGB: ABNORMAL
URINE HGB: NEGATIVE
UROBILINOGEN, URINE: NORMAL
UROBILINOGEN, URINE: NORMAL
VITAMIN B-12: 1524 PG/ML (ref 232–1245)
WBC # BLD: 15.2 K/UL (ref 3.5–11.3)
WBC # BLD: 15.8 K/UL (ref 3.5–11.3)
WBC # BLD: 16 K/UL (ref 3.5–11.3)
WBC # BLD: 16.5 K/UL (ref 3.5–11.3)
WBC # BLD: 17.2 K/UL (ref 3.5–11.3)
WBC # BLD: 17.2 K/UL (ref 3.5–11.3)
WBC # BLD: 17.6 K/UL (ref 3.5–11.3)
WBC # BLD: 18.5 K/UL (ref 3.5–11.3)
WBC # BLD: 22.9 K/UL (ref 3.5–11.3)
WBC # BLD: 27.4 K/UL (ref 3.5–11.3)
WBC # BLD: 27.5 K/UL (ref 3.5–11.3)
WBC # BLD: 29.4 K/UL (ref 3.5–11.3)
WBC # BLD: ABNORMAL 10*3/UL
WBC UA: ABNORMAL /HPF (ref 0–5)
YEAST: ABNORMAL

## 2018-01-01 PROCEDURE — 2580000003 HC RX 258: Performed by: ANESTHESIOLOGY

## 2018-01-01 PROCEDURE — 7100000001 HC PACU RECOVERY - ADDTL 15 MIN: Performed by: OBSTETRICS & GYNECOLOGY

## 2018-01-01 PROCEDURE — 84484 ASSAY OF TROPONIN QUANT: CPT

## 2018-01-01 PROCEDURE — 71046 X-RAY EXAM CHEST 2 VIEWS: CPT

## 2018-01-01 PROCEDURE — 6370000000 HC RX 637 (ALT 250 FOR IP): Performed by: NURSE PRACTITIONER

## 2018-01-01 PROCEDURE — 85018 HEMOGLOBIN: CPT

## 2018-01-01 PROCEDURE — 2580000003 HC RX 258: Performed by: STUDENT IN AN ORGANIZED HEALTH CARE EDUCATION/TRAINING PROGRAM

## 2018-01-01 PROCEDURE — 6360000002 HC RX W HCPCS: Performed by: NURSE PRACTITIONER

## 2018-01-01 PROCEDURE — 86304 IMMUNOASSAY TUMOR CA 125: CPT

## 2018-01-01 PROCEDURE — 82947 ASSAY GLUCOSE BLOOD QUANT: CPT

## 2018-01-01 PROCEDURE — 99232 SBSQ HOSP IP/OBS MODERATE 35: CPT | Performed by: INTERNAL MEDICINE

## 2018-01-01 PROCEDURE — 0UBG7ZX EXCISION OF VAGINA, VIA NATURAL OR ARTIFICIAL OPENING, DIAGNOSTIC: ICD-10-PCS | Performed by: OBSTETRICS & GYNECOLOGY

## 2018-01-01 PROCEDURE — P9016 RBC LEUKOCYTES REDUCED: HCPCS

## 2018-01-01 PROCEDURE — 2500000003 HC RX 250 WO HCPCS: Performed by: OBSTETRICS & GYNECOLOGY

## 2018-01-01 PROCEDURE — 6370000000 HC RX 637 (ALT 250 FOR IP): Performed by: INTERNAL MEDICINE

## 2018-01-01 PROCEDURE — 2580000003 HC RX 258

## 2018-01-01 PROCEDURE — 6360000002 HC RX W HCPCS: Performed by: CLINICAL NURSE SPECIALIST

## 2018-01-01 PROCEDURE — 2580000003 HC RX 258: Performed by: OBSTETRICS & GYNECOLOGY

## 2018-01-01 PROCEDURE — 74018 RADEX ABDOMEN 1 VIEW: CPT

## 2018-01-01 PROCEDURE — 52000 CYSTOURETHROSCOPY: CPT | Performed by: OBSTETRICS & GYNECOLOGY

## 2018-01-01 PROCEDURE — 2500000003 HC RX 250 WO HCPCS: Performed by: INTERNAL MEDICINE

## 2018-01-01 PROCEDURE — 77334 RADIATION TREATMENT AID(S): CPT | Performed by: RADIOLOGY

## 2018-01-01 PROCEDURE — 36556 INSERT NON-TUNNEL CV CATH: CPT

## 2018-01-01 PROCEDURE — 93005 ELECTROCARDIOGRAM TRACING: CPT

## 2018-01-01 PROCEDURE — 2580000003 HC RX 258: Performed by: INTERNAL MEDICINE

## 2018-01-01 PROCEDURE — 36430 TRANSFUSION BLD/BLD COMPNT: CPT

## 2018-01-01 PROCEDURE — 6370000000 HC RX 637 (ALT 250 FOR IP): Performed by: STUDENT IN AN ORGANIZED HEALTH CARE EDUCATION/TRAINING PROGRAM

## 2018-01-01 PROCEDURE — 6370000000 HC RX 637 (ALT 250 FOR IP): Performed by: OBSTETRICS & GYNECOLOGY

## 2018-01-01 PROCEDURE — 2060000000 HC ICU INTERMEDIATE R&B

## 2018-01-01 PROCEDURE — 3430000000 HC RX DIAGNOSTIC RADIOPHARMACEUTICAL: Performed by: INTERNAL MEDICINE

## 2018-01-01 PROCEDURE — 2500000003 HC RX 250 WO HCPCS: Performed by: NURSE ANESTHETIST, CERTIFIED REGISTERED

## 2018-01-01 PROCEDURE — 84295 ASSAY OF SERUM SODIUM: CPT

## 2018-01-01 PROCEDURE — 87591 N.GONORRHOEAE DNA AMP PROB: CPT

## 2018-01-01 PROCEDURE — 97116 GAIT TRAINING THERAPY: CPT

## 2018-01-01 PROCEDURE — 77300 RADIATION THERAPY DOSE PLAN: CPT | Performed by: RADIOLOGY

## 2018-01-01 PROCEDURE — 94762 N-INVAS EAR/PLS OXIMTRY CONT: CPT

## 2018-01-01 PROCEDURE — 77387 GUIDANCE FOR RADJ TX DLVR: CPT | Performed by: RADIOLOGY

## 2018-01-01 PROCEDURE — 2580000003 HC RX 258: Performed by: NURSE PRACTITIONER

## 2018-01-01 PROCEDURE — 99285 EMERGENCY DEPT VISIT HI MDM: CPT

## 2018-01-01 PROCEDURE — 84132 ASSAY OF SERUM POTASSIUM: CPT

## 2018-01-01 PROCEDURE — 87070 CULTURE OTHR SPECIMN AEROBIC: CPT

## 2018-01-01 PROCEDURE — 85025 COMPLETE CBC W/AUTO DIFF WBC: CPT

## 2018-01-01 PROCEDURE — 82330 ASSAY OF CALCIUM: CPT

## 2018-01-01 PROCEDURE — 36415 COLL VENOUS BLD VENIPUNCTURE: CPT

## 2018-01-01 PROCEDURE — G8978 MOBILITY CURRENT STATUS: HCPCS

## 2018-01-01 PROCEDURE — 2500000003 HC RX 250 WO HCPCS: Performed by: NURSE PRACTITIONER

## 2018-01-01 PROCEDURE — 87255 GENET VIRUS ISOLATE HSV: CPT

## 2018-01-01 PROCEDURE — A9552 F18 FDG: HCPCS | Performed by: INTERNAL MEDICINE

## 2018-01-01 PROCEDURE — 99232 SBSQ HOSP IP/OBS MODERATE 35: CPT | Performed by: FAMILY MEDICINE

## 2018-01-01 PROCEDURE — 87077 CULTURE AEROBIC IDENTIFY: CPT

## 2018-01-01 PROCEDURE — 0TJB8ZZ INSPECTION OF BLADDER, VIA NATURAL OR ARTIFICIAL OPENING ENDOSCOPIC: ICD-10-PCS | Performed by: OBSTETRICS & GYNECOLOGY

## 2018-01-01 PROCEDURE — 3600000012 HC SURGERY LEVEL 2 ADDTL 15MIN: Performed by: OBSTETRICS & GYNECOLOGY

## 2018-01-01 PROCEDURE — 83735 ASSAY OF MAGNESIUM: CPT

## 2018-01-01 PROCEDURE — 85014 HEMATOCRIT: CPT

## 2018-01-01 PROCEDURE — 87186 SC STD MICRODIL/AGAR DIL: CPT

## 2018-01-01 PROCEDURE — 1200000000 HC SEMI PRIVATE

## 2018-01-01 PROCEDURE — 87641 MR-STAPH DNA AMP PROBE: CPT

## 2018-01-01 PROCEDURE — 80048 BASIC METABOLIC PNL TOTAL CA: CPT

## 2018-01-01 PROCEDURE — 6370000000 HC RX 637 (ALT 250 FOR IP): Performed by: CLINICAL NURSE SPECIALIST

## 2018-01-01 PROCEDURE — 2580000003 HC RX 258: Performed by: NURSE ANESTHETIST, CERTIFIED REGISTERED

## 2018-01-01 PROCEDURE — 99223 1ST HOSP IP/OBS HIGH 75: CPT | Performed by: INTERNAL MEDICINE

## 2018-01-01 PROCEDURE — 97162 PT EVAL MOD COMPLEX 30 MIN: CPT

## 2018-01-01 PROCEDURE — 6370000000 HC RX 637 (ALT 250 FOR IP): Performed by: EMERGENCY MEDICINE

## 2018-01-01 PROCEDURE — 74176 CT ABD & PELVIS W/O CONTRAST: CPT

## 2018-01-01 PROCEDURE — 74177 CT ABD & PELVIS W/CONTRAST: CPT

## 2018-01-01 PROCEDURE — 2500000003 HC RX 250 WO HCPCS

## 2018-01-01 PROCEDURE — 82803 BLOOD GASES ANY COMBINATION: CPT

## 2018-01-01 PROCEDURE — 97530 THERAPEUTIC ACTIVITIES: CPT

## 2018-01-01 PROCEDURE — 6360000002 HC RX W HCPCS: Performed by: INTERNAL MEDICINE

## 2018-01-01 PROCEDURE — 6370000000 HC RX 637 (ALT 250 FOR IP): Performed by: OPHTHALMOLOGY

## 2018-01-01 PROCEDURE — 88305 TISSUE EXAM BY PATHOLOGIST: CPT

## 2018-01-01 PROCEDURE — 87510 GARDNER VAG DNA DIR PROBE: CPT

## 2018-01-01 PROCEDURE — 2580000003 HC RX 258: Performed by: EMERGENCY MEDICINE

## 2018-01-01 PROCEDURE — 86920 COMPATIBILITY TEST SPIN: CPT

## 2018-01-01 PROCEDURE — 7100000000 HC PACU RECOVERY - FIRST 15 MIN: Performed by: OBSTETRICS & GYNECOLOGY

## 2018-01-01 PROCEDURE — 3700000001 HC ADD 15 MINUTES (ANESTHESIA): Performed by: OBSTETRICS & GYNECOLOGY

## 2018-01-01 PROCEDURE — 86850 RBC ANTIBODY SCREEN: CPT

## 2018-01-01 PROCEDURE — 88331 PATH CONSLTJ SURG 1 BLK 1SPC: CPT

## 2018-01-01 PROCEDURE — 80053 COMPREHEN METABOLIC PANEL: CPT

## 2018-01-01 PROCEDURE — 87086 URINE CULTURE/COLONY COUNT: CPT

## 2018-01-01 PROCEDURE — 82435 ASSAY OF BLOOD CHLORIDE: CPT

## 2018-01-01 PROCEDURE — 3600000002 HC SURGERY LEVEL 2 BASE: Performed by: OBSTETRICS & GYNECOLOGY

## 2018-01-01 PROCEDURE — G8987 SELF CARE CURRENT STATUS: HCPCS

## 2018-01-01 PROCEDURE — 99239 HOSP IP/OBS DSCHRG MGMT >30: CPT | Performed by: INTERNAL MEDICINE

## 2018-01-01 PROCEDURE — 3700000000 HC ANESTHESIA ATTENDED CARE: Performed by: OBSTETRICS & GYNECOLOGY

## 2018-01-01 PROCEDURE — G8988 SELF CARE GOAL STATUS: HCPCS

## 2018-01-01 PROCEDURE — 83605 ASSAY OF LACTIC ACID: CPT

## 2018-01-01 PROCEDURE — 77280 THER RAD SIMULAJ FIELD SMPL: CPT | Performed by: RADIOLOGY

## 2018-01-01 PROCEDURE — 99239 HOSP IP/OBS DSCHRG MGMT >30: CPT | Performed by: FAMILY MEDICINE

## 2018-01-01 PROCEDURE — 36416 COLLJ CAPILLARY BLOOD SPEC: CPT

## 2018-01-01 PROCEDURE — 85027 COMPLETE CBC AUTOMATED: CPT

## 2018-01-01 PROCEDURE — 83036 HEMOGLOBIN GLYCOSYLATED A1C: CPT

## 2018-01-01 PROCEDURE — 76856 US EXAM PELVIC COMPLETE: CPT

## 2018-01-01 PROCEDURE — 99291 CRITICAL CARE FIRST HOUR: CPT | Performed by: INTERNAL MEDICINE

## 2018-01-01 PROCEDURE — 6360000002 HC RX W HCPCS: Performed by: EMERGENCY MEDICINE

## 2018-01-01 PROCEDURE — 0BH18EZ INSERTION OF ENDOTRACHEAL AIRWAY INTO TRACHEA, VIA NATURAL OR ARTIFICIAL OPENING ENDOSCOPIC: ICD-10-PCS | Performed by: INTERNAL MEDICINE

## 2018-01-01 PROCEDURE — 97110 THERAPEUTIC EXERCISES: CPT

## 2018-01-01 PROCEDURE — 97535 SELF CARE MNGMENT TRAINING: CPT

## 2018-01-01 PROCEDURE — 36600 WITHDRAWAL OF ARTERIAL BLOOD: CPT

## 2018-01-01 PROCEDURE — 84443 ASSAY THYROID STIM HORMONE: CPT

## 2018-01-01 PROCEDURE — 96374 THER/PROPH/DIAG INJ IV PUSH: CPT

## 2018-01-01 PROCEDURE — 85730 THROMBOPLASTIN TIME PARTIAL: CPT

## 2018-01-01 PROCEDURE — 6360000002 HC RX W HCPCS: Performed by: STUDENT IN AN ORGANIZED HEALTH CARE EDUCATION/TRAINING PROGRAM

## 2018-01-01 PROCEDURE — 99497 ADVNCD CARE PLAN 30 MIN: CPT | Performed by: FAMILY MEDICINE

## 2018-01-01 PROCEDURE — 99221 1ST HOSP IP/OBS SF/LOW 40: CPT | Performed by: FAMILY MEDICINE

## 2018-01-01 PROCEDURE — 86901 BLOOD TYPING SEROLOGIC RH(D): CPT

## 2018-01-01 PROCEDURE — 99233 SBSQ HOSP IP/OBS HIGH 50: CPT | Performed by: INTERNAL MEDICINE

## 2018-01-01 PROCEDURE — 85610 PROTHROMBIN TIME: CPT

## 2018-01-01 PROCEDURE — 81003 URINALYSIS AUTO W/O SCOPE: CPT

## 2018-01-01 PROCEDURE — 97166 OT EVAL MOD COMPLEX 45 MIN: CPT

## 2018-01-01 PROCEDURE — 2580000003 HC RX 258: Performed by: CLINICAL NURSE SPECIALIST

## 2018-01-01 PROCEDURE — 94770 HC ETCO2 MONITOR DAILY: CPT

## 2018-01-01 PROCEDURE — 87205 SMEAR GRAM STAIN: CPT

## 2018-01-01 PROCEDURE — 86900 BLOOD TYPING SEROLOGIC ABO: CPT

## 2018-01-01 PROCEDURE — 99213 OFFICE O/P EST LOW 20 MIN: CPT | Performed by: RADIOLOGY

## 2018-01-01 PROCEDURE — 83550 IRON BINDING TEST: CPT

## 2018-01-01 PROCEDURE — 71045 X-RAY EXAM CHEST 1 VIEW: CPT

## 2018-01-01 PROCEDURE — 99222 1ST HOSP IP/OBS MODERATE 55: CPT | Performed by: INTERNAL MEDICINE

## 2018-01-01 PROCEDURE — 99498 ADVNCD CARE PLAN ADDL 30 MIN: CPT | Performed by: FAMILY MEDICINE

## 2018-01-01 PROCEDURE — 6360000002 HC RX W HCPCS: Performed by: NURSE ANESTHETIST, CERTIFIED REGISTERED

## 2018-01-01 PROCEDURE — 0DJD8ZZ INSPECTION OF LOWER INTESTINAL TRACT, VIA NATURAL OR ARTIFICIAL OPENING ENDOSCOPIC: ICD-10-PCS | Performed by: OBSTETRICS & GYNECOLOGY

## 2018-01-01 PROCEDURE — 83540 ASSAY OF IRON: CPT

## 2018-01-01 PROCEDURE — 87040 BLOOD CULTURE FOR BACTERIA: CPT

## 2018-01-01 PROCEDURE — 71260 CT THORAX DX C+: CPT

## 2018-01-01 PROCEDURE — 6360000002 HC RX W HCPCS

## 2018-01-01 PROCEDURE — 87076 CULTURE ANAEROBE IDENT EACH: CPT

## 2018-01-01 PROCEDURE — 87300 AG DETECTION POLYVAL IF: CPT

## 2018-01-01 PROCEDURE — 82728 ASSAY OF FERRITIN: CPT

## 2018-01-01 PROCEDURE — 81001 URINALYSIS AUTO W/SCOPE: CPT

## 2018-01-01 PROCEDURE — G8979 MOBILITY GOAL STATUS: HCPCS

## 2018-01-01 PROCEDURE — 87075 CULTR BACTERIA EXCEPT BLOOD: CPT

## 2018-01-01 PROCEDURE — 88342 IMHCHEM/IMCYTCHM 1ST ANTB: CPT

## 2018-01-01 PROCEDURE — 87660 TRICHOMONAS VAGIN DIR PROBE: CPT

## 2018-01-01 PROCEDURE — 2000000000 HC ICU R&B

## 2018-01-01 PROCEDURE — 6360000004 HC RX CONTRAST MEDICATION: Performed by: OBSTETRICS & GYNECOLOGY

## 2018-01-01 PROCEDURE — 94002 VENT MGMT INPAT INIT DAY: CPT

## 2018-01-01 PROCEDURE — 1250000000 HC SEMI PRIVATE HOSPICE R&B

## 2018-01-01 PROCEDURE — 57500 BIOPSY OF CERVIX: CPT | Performed by: OBSTETRICS & GYNECOLOGY

## 2018-01-01 PROCEDURE — 87185 SC STD ENZYME DETCJ PER NZM: CPT

## 2018-01-01 PROCEDURE — 87140 CULTURE TYPE IMMUNOFLUORESC: CPT

## 2018-01-01 PROCEDURE — 5A1935Z RESPIRATORY VENTILATION, LESS THAN 24 CONSECUTIVE HOURS: ICD-10-PCS | Performed by: INTERNAL MEDICINE

## 2018-01-01 PROCEDURE — 36620 INSERTION CATHETER ARTERY: CPT

## 2018-01-01 PROCEDURE — 84100 ASSAY OF PHOSPHORUS: CPT

## 2018-01-01 PROCEDURE — 77295 3-D RADIOTHERAPY PLAN: CPT | Performed by: RADIOLOGY

## 2018-01-01 PROCEDURE — 82565 ASSAY OF CREATININE: CPT

## 2018-01-01 PROCEDURE — 6370000000 HC RX 637 (ALT 250 FOR IP)

## 2018-01-01 PROCEDURE — 72197 MRI PELVIS W/O & W/DYE: CPT

## 2018-01-01 PROCEDURE — 82746 ASSAY OF FOLIC ACID SERUM: CPT

## 2018-01-01 PROCEDURE — 87015 SPECIMEN INFECT AGNT CONCNTJ: CPT

## 2018-01-01 PROCEDURE — 77412 RADIATION TX DELIVERY LVL 3: CPT | Performed by: RADIOLOGY

## 2018-01-01 PROCEDURE — 78815 PET IMAGE W/CT SKULL-THIGH: CPT

## 2018-01-01 PROCEDURE — 87480 CANDIDA DNA DIR PROBE: CPT

## 2018-01-01 PROCEDURE — 77290 THER RAD SIMULAJ FIELD CPLX: CPT | Performed by: RADIOLOGY

## 2018-01-01 PROCEDURE — 76937 US GUIDE VASCULAR ACCESS: CPT

## 2018-01-01 PROCEDURE — 82378 CARCINOEMBRYONIC ANTIGEN: CPT

## 2018-01-01 PROCEDURE — 87252 VIRUS INOCULATION TISSUE: CPT

## 2018-01-01 PROCEDURE — 82607 VITAMIN B-12: CPT

## 2018-01-01 PROCEDURE — 6360000004 HC RX CONTRAST MEDICATION

## 2018-01-01 PROCEDURE — A9576 INJ PROHANCE MULTIPACK: HCPCS | Performed by: OBSTETRICS & GYNECOLOGY

## 2018-01-01 PROCEDURE — 86301 IMMUNOASSAY TUMOR CA 19-9: CPT

## 2018-01-01 PROCEDURE — 88341 IMHCHEM/IMCYTCHM EA ADD ANTB: CPT

## 2018-01-01 PROCEDURE — 87491 CHLMYD TRACH DNA AMP PROBE: CPT

## 2018-01-01 PROCEDURE — 06HY33Z INSERTION OF INFUSION DEVICE INTO LOWER VEIN, PERCUTANEOUS APPROACH: ICD-10-PCS | Performed by: INTERNAL MEDICINE

## 2018-01-01 RX ORDER — HYDRALAZINE HYDROCHLORIDE 20 MG/ML
5 INJECTION INTRAMUSCULAR; INTRAVENOUS EVERY 10 MIN PRN
Status: DISCONTINUED | OUTPATIENT
Start: 2018-01-01 | End: 2018-01-01

## 2018-01-01 RX ORDER — ATROPINE SULFATE 0.1 MG/ML
INJECTION INTRAVENOUS
Status: DISPENSED
Start: 2018-01-01 | End: 2018-01-01

## 2018-01-01 RX ORDER — NICOTINE POLACRILEX 4 MG
15 LOZENGE BUCCAL PRN
Status: DISCONTINUED | OUTPATIENT
Start: 2018-01-01 | End: 2018-01-01 | Stop reason: HOSPADM

## 2018-01-01 RX ORDER — LEVOFLOXACIN 500 MG/1
500 TABLET, FILM COATED ORAL DAILY
Status: DISCONTINUED | OUTPATIENT
Start: 2018-01-01 | End: 2018-01-01 | Stop reason: HOSPADM

## 2018-01-01 RX ORDER — MIDODRINE HYDROCHLORIDE 5 MG/1
5 TABLET ORAL ONCE
Status: DISCONTINUED | OUTPATIENT
Start: 2018-01-01 | End: 2018-01-01 | Stop reason: HOSPADM

## 2018-01-01 RX ORDER — SODIUM CHLORIDE, SODIUM LACTATE, POTASSIUM CHLORIDE, CALCIUM CHLORIDE 600; 310; 30; 20 MG/100ML; MG/100ML; MG/100ML; MG/100ML
INJECTION, SOLUTION INTRAVENOUS CONTINUOUS
Status: DISCONTINUED | OUTPATIENT
Start: 2018-01-01 | End: 2018-01-01

## 2018-01-01 RX ORDER — MEPERIDINE HYDROCHLORIDE 50 MG/ML
12.5 INJECTION INTRAMUSCULAR; INTRAVENOUS; SUBCUTANEOUS EVERY 5 MIN PRN
Status: DISCONTINUED | OUTPATIENT
Start: 2018-01-01 | End: 2018-01-01 | Stop reason: HOSPADM

## 2018-01-01 RX ORDER — ASPIRIN 81 MG/1
81 TABLET ORAL DAILY
Status: DISCONTINUED | OUTPATIENT
Start: 2018-01-01 | End: 2018-01-01

## 2018-01-01 RX ORDER — ONDANSETRON 2 MG/ML
4 INJECTION INTRAMUSCULAR; INTRAVENOUS EVERY 8 HOURS PRN
Status: DISCONTINUED | OUTPATIENT
Start: 2018-01-01 | End: 2018-01-01 | Stop reason: HOSPADM

## 2018-01-01 RX ORDER — HYDROCHLOROTHIAZIDE 25 MG/1
25 TABLET ORAL DAILY
Status: DISCONTINUED | OUTPATIENT
Start: 2018-01-01 | End: 2018-01-01 | Stop reason: HOSPADM

## 2018-01-01 RX ORDER — ONDANSETRON 4 MG/1
4 TABLET, ORALLY DISINTEGRATING ORAL EVERY 8 HOURS PRN
Status: DISCONTINUED | OUTPATIENT
Start: 2018-01-01 | End: 2018-01-01 | Stop reason: HOSPADM

## 2018-01-01 RX ORDER — ONDANSETRON 2 MG/ML
4 INJECTION INTRAMUSCULAR; INTRAVENOUS ONCE
Status: COMPLETED | OUTPATIENT
Start: 2018-01-01 | End: 2018-01-01

## 2018-01-01 RX ORDER — SODIUM CHLORIDE 0.9 % (FLUSH) 0.9 %
10 SYRINGE (ML) INJECTION PRN
Status: DISCONTINUED | OUTPATIENT
Start: 2018-01-01 | End: 2018-01-01 | Stop reason: HOSPADM

## 2018-01-01 RX ORDER — ONDANSETRON 2 MG/ML
4 INJECTION INTRAMUSCULAR; INTRAVENOUS EVERY 6 HOURS PRN
Status: DISCONTINUED | OUTPATIENT
Start: 2018-01-01 | End: 2018-01-01

## 2018-01-01 RX ORDER — ONDANSETRON 2 MG/ML
INJECTION INTRAMUSCULAR; INTRAVENOUS PRN
Status: DISCONTINUED | OUTPATIENT
Start: 2018-01-01 | End: 2018-01-01 | Stop reason: SDUPTHER

## 2018-01-01 RX ORDER — LABETALOL 200 MG/1
300 TABLET, FILM COATED ORAL 2 TIMES DAILY
Status: DISCONTINUED | OUTPATIENT
Start: 2018-01-01 | End: 2018-01-01

## 2018-01-01 RX ORDER — SODIUM CHLORIDE 0.9 % (FLUSH) 0.9 %
10 SYRINGE (ML) INJECTION PRN
Status: CANCELLED | OUTPATIENT
Start: 2018-01-01

## 2018-01-01 RX ORDER — FENTANYL CITRATE 50 UG/ML
25 INJECTION, SOLUTION INTRAMUSCULAR; INTRAVENOUS EVERY 30 MIN PRN
Status: DISCONTINUED | OUTPATIENT
Start: 2018-01-01 | End: 2018-01-01 | Stop reason: HOSPADM

## 2018-01-01 RX ORDER — 0.9 % SODIUM CHLORIDE 0.9 %
1000 INTRAVENOUS SOLUTION INTRAVENOUS ONCE
Status: DISCONTINUED | OUTPATIENT
Start: 2018-01-01 | End: 2018-01-01 | Stop reason: HOSPADM

## 2018-01-01 RX ORDER — FENTANYL CITRATE 50 UG/ML
50 INJECTION, SOLUTION INTRAMUSCULAR; INTRAVENOUS EVERY 30 MIN PRN
Status: DISCONTINUED | OUTPATIENT
Start: 2018-01-01 | End: 2018-01-01 | Stop reason: HOSPADM

## 2018-01-01 RX ORDER — HYDROCODONE BITARTRATE AND ACETAMINOPHEN 5; 325 MG/1; MG/1
1 TABLET ORAL EVERY 4 HOURS PRN
Status: DISCONTINUED | OUTPATIENT
Start: 2018-01-01 | End: 2018-01-01 | Stop reason: HOSPADM

## 2018-01-01 RX ORDER — LABETALOL HYDROCHLORIDE 5 MG/ML
5 INJECTION, SOLUTION INTRAVENOUS EVERY 10 MIN PRN
Status: DISCONTINUED | OUTPATIENT
Start: 2018-01-01 | End: 2018-01-01

## 2018-01-01 RX ORDER — NALOXONE HYDROCHLORIDE 0.4 MG/ML
INJECTION, SOLUTION INTRAMUSCULAR; INTRAVENOUS; SUBCUTANEOUS
Status: DISCONTINUED
Start: 2018-01-01 | End: 2018-01-01

## 2018-01-01 RX ORDER — ATORVASTATIN CALCIUM 40 MG/1
40 TABLET, FILM COATED ORAL DAILY
Status: DISCONTINUED | OUTPATIENT
Start: 2018-01-01 | End: 2018-01-01 | Stop reason: HOSPADM

## 2018-01-01 RX ORDER — MORPHINE SULFATE 4 MG/ML
2 INJECTION, SOLUTION INTRAMUSCULAR; INTRAVENOUS
Status: DISCONTINUED | OUTPATIENT
Start: 2018-01-01 | End: 2018-01-01

## 2018-01-01 RX ORDER — MIDODRINE HYDROCHLORIDE 2.5 MG/1
2.5 TABLET ORAL
Status: DISCONTINUED | OUTPATIENT
Start: 2018-01-01 | End: 2018-01-01

## 2018-01-01 RX ORDER — VERAPAMIL HYDROCHLORIDE 240 MG/1
240 TABLET, FILM COATED, EXTENDED RELEASE ORAL 2 TIMES DAILY
Status: DISCONTINUED | OUTPATIENT
Start: 2018-01-01 | End: 2018-01-01 | Stop reason: HOSPADM

## 2018-01-01 RX ORDER — DEXTROSE MONOHYDRATE 25 G/50ML
12.5 INJECTION, SOLUTION INTRAVENOUS PRN
Status: DISCONTINUED | OUTPATIENT
Start: 2018-01-01 | End: 2018-01-01

## 2018-01-01 RX ORDER — HYDROCODONE BITARTRATE AND ACETAMINOPHEN 5; 325 MG/1; MG/1
1 TABLET ORAL EVERY 6 HOURS PRN
Status: CANCELLED | OUTPATIENT
Start: 2018-01-01

## 2018-01-01 RX ORDER — SENNA PLUS 8.6 MG/1
2 TABLET ORAL NIGHTLY
Status: DISCONTINUED | OUTPATIENT
Start: 2018-01-01 | End: 2018-01-01 | Stop reason: HOSPADM

## 2018-01-01 RX ORDER — 0.9 % SODIUM CHLORIDE 0.9 %
250 INTRAVENOUS SOLUTION INTRAVENOUS ONCE
Status: DISCONTINUED | OUTPATIENT
Start: 2018-01-01 | End: 2018-01-01 | Stop reason: HOSPADM

## 2018-01-01 RX ORDER — DOCUSATE SODIUM 100 MG/1
100 CAPSULE, LIQUID FILLED ORAL 2 TIMES DAILY PRN
Status: DISCONTINUED | OUTPATIENT
Start: 2018-01-01 | End: 2018-01-01 | Stop reason: HOSPADM

## 2018-01-01 RX ORDER — ONDANSETRON 4 MG/1
4 TABLET, ORALLY DISINTEGRATING ORAL EVERY 8 HOURS PRN
Qty: 15 TABLET | Refills: 0 | Status: ON HOLD | OUTPATIENT
Start: 2018-01-01 | End: 2018-01-01

## 2018-01-01 RX ORDER — 0.9 % SODIUM CHLORIDE 0.9 %
500 INTRAVENOUS SOLUTION INTRAVENOUS ONCE
Status: COMPLETED | OUTPATIENT
Start: 2018-01-01 | End: 2018-01-01

## 2018-01-01 RX ORDER — DOCUSATE SODIUM 100 MG/1
100 CAPSULE, LIQUID FILLED ORAL 2 TIMES DAILY PRN
Qty: 60 CAPSULE | Refills: 0 | Status: ON HOLD | OUTPATIENT
Start: 2018-01-01 | End: 2018-01-01

## 2018-01-01 RX ORDER — FENTANYL CITRATE 50 UG/ML
50 INJECTION, SOLUTION INTRAMUSCULAR; INTRAVENOUS EVERY 5 MIN PRN
Status: DISCONTINUED | OUTPATIENT
Start: 2018-01-01 | End: 2018-01-01

## 2018-01-01 RX ORDER — SODIUM CHLORIDE 0.9 % (FLUSH) 0.9 %
10 SYRINGE (ML) INJECTION EVERY 12 HOURS SCHEDULED
Status: DISCONTINUED | OUTPATIENT
Start: 2018-01-01 | End: 2018-01-01 | Stop reason: HOSPADM

## 2018-01-01 RX ORDER — CIPROFLOXACIN HYDROCHLORIDE 3.5 MG/ML
1 SOLUTION/ DROPS TOPICAL
Status: DISCONTINUED | OUTPATIENT
Start: 2018-01-01 | End: 2018-01-01

## 2018-01-01 RX ORDER — SENNA PLUS 8.6 MG/1
2 TABLET ORAL NIGHTLY
Status: CANCELLED | OUTPATIENT
Start: 2018-01-01

## 2018-01-01 RX ORDER — METOPROLOL TARTRATE 5 MG/5ML
5 INJECTION INTRAVENOUS EVERY 4 HOURS PRN
Status: DISCONTINUED | OUTPATIENT
Start: 2018-01-01 | End: 2018-01-01 | Stop reason: HOSPADM

## 2018-01-01 RX ORDER — 0.9 % SODIUM CHLORIDE 0.9 %
1000 INTRAVENOUS SOLUTION INTRAVENOUS ONCE
Status: COMPLETED | OUTPATIENT
Start: 2018-01-01 | End: 2018-01-01

## 2018-01-01 RX ORDER — SENNA PLUS 8.6 MG/1
2 TABLET ORAL NIGHTLY
Qty: 60 TABLET | Refills: 0 | Status: SHIPPED | OUTPATIENT
Start: 2018-01-01 | End: 2018-08-12

## 2018-01-01 RX ORDER — ACETAMINOPHEN 325 MG/1
650 TABLET ORAL EVERY 4 HOURS PRN
Status: CANCELLED | OUTPATIENT
Start: 2018-01-01

## 2018-01-01 RX ORDER — SODIUM CHLORIDE 9 MG/ML
INJECTION, SOLUTION INTRAVENOUS CONTINUOUS
Status: CANCELLED | OUTPATIENT
Start: 2018-01-01

## 2018-01-01 RX ORDER — MORPHINE SULFATE 2 MG/ML
2 INJECTION, SOLUTION INTRAMUSCULAR; INTRAVENOUS
Status: DISCONTINUED | OUTPATIENT
Start: 2018-01-01 | End: 2018-01-01 | Stop reason: HOSPADM

## 2018-01-01 RX ORDER — GLIPIZIDE 10 MG/1
10 TABLET ORAL
Status: DISCONTINUED | OUTPATIENT
Start: 2018-01-01 | End: 2018-01-01

## 2018-01-01 RX ORDER — HYDROCODONE BITARTRATE AND ACETAMINOPHEN 5; 325 MG/1; MG/1
2 TABLET ORAL EVERY 4 HOURS PRN
Status: DISCONTINUED | OUTPATIENT
Start: 2018-01-01 | End: 2018-01-01 | Stop reason: HOSPADM

## 2018-01-01 RX ORDER — LORAZEPAM 2 MG/ML
3 INJECTION INTRAMUSCULAR EVERY 30 MIN PRN
Status: DISCONTINUED | OUTPATIENT
Start: 2018-01-01 | End: 2018-01-01

## 2018-01-01 RX ORDER — ONDANSETRON 2 MG/ML
4 INJECTION INTRAMUSCULAR; INTRAVENOUS EVERY 6 HOURS PRN
Status: DISCONTINUED | OUTPATIENT
Start: 2018-01-01 | End: 2018-01-01 | Stop reason: HOSPADM

## 2018-01-01 RX ORDER — FENTANYL CITRATE 50 UG/ML
75 INJECTION, SOLUTION INTRAMUSCULAR; INTRAVENOUS EVERY 30 MIN PRN
Status: DISCONTINUED | OUTPATIENT
Start: 2018-01-01 | End: 2018-01-01 | Stop reason: HOSPADM

## 2018-01-01 RX ORDER — ACETAMINOPHEN 160 MG/5ML
650 SOLUTION ORAL ONCE
Status: COMPLETED | OUTPATIENT
Start: 2018-01-01 | End: 2018-01-01

## 2018-01-01 RX ORDER — ASPIRIN 81 MG/1
81 TABLET, CHEWABLE ORAL DAILY
Status: DISCONTINUED | OUTPATIENT
Start: 2018-01-01 | End: 2018-01-01

## 2018-01-01 RX ORDER — POTASSIUM CHLORIDE 20 MEQ/1
40 TABLET, EXTENDED RELEASE ORAL PRN
Status: DISCONTINUED | OUTPATIENT
Start: 2018-01-01 | End: 2018-01-01

## 2018-01-01 RX ORDER — ACETAMINOPHEN 325 MG/1
650 TABLET ORAL EVERY 4 HOURS PRN
Qty: 120 TABLET | Refills: 3 | Status: SHIPPED | OUTPATIENT
Start: 2018-01-01

## 2018-01-01 RX ORDER — SODIUM CHLORIDE 0.9 % (FLUSH) 0.9 %
10 SYRINGE (ML) INJECTION EVERY 12 HOURS SCHEDULED
Status: CANCELLED | OUTPATIENT
Start: 2018-01-01

## 2018-01-01 RX ORDER — ACETAMINOPHEN 325 MG/1
650 TABLET ORAL EVERY 4 HOURS PRN
Status: DISCONTINUED | OUTPATIENT
Start: 2018-01-01 | End: 2018-01-01 | Stop reason: HOSPADM

## 2018-01-01 RX ORDER — POLYETHYLENE GLYCOL 3350 17 G/17G
17 POWDER, FOR SOLUTION ORAL DAILY
Status: DISCONTINUED | OUTPATIENT
Start: 2018-01-01 | End: 2018-01-01

## 2018-01-01 RX ORDER — ATORVASTATIN CALCIUM 80 MG/1
40 TABLET, FILM COATED ORAL DAILY
Status: DISCONTINUED | OUTPATIENT
Start: 2018-01-01 | End: 2018-01-01

## 2018-01-01 RX ORDER — GLYCOPYRROLATE 0.2 MG/ML
0.1 INJECTION INTRAMUSCULAR; INTRAVENOUS EVERY 6 HOURS PRN
Status: CANCELLED | OUTPATIENT
Start: 2018-01-01

## 2018-01-01 RX ORDER — POTASSIUM CHLORIDE 20MEQ/15ML
40 LIQUID (ML) ORAL PRN
Status: DISCONTINUED | OUTPATIENT
Start: 2018-01-01 | End: 2018-01-01

## 2018-01-01 RX ORDER — VERAPAMIL HYDROCHLORIDE 240 MG/1
240 TABLET, FILM COATED, EXTENDED RELEASE ORAL 2 TIMES DAILY
Status: DISCONTINUED | OUTPATIENT
Start: 2018-01-01 | End: 2018-01-01

## 2018-01-01 RX ORDER — MORPHINE SULFATE 10 MG/ML
5 INJECTION, SOLUTION INTRAMUSCULAR; INTRAVENOUS EVERY 30 MIN PRN
Status: DISCONTINUED | OUTPATIENT
Start: 2018-01-01 | End: 2018-01-01

## 2018-01-01 RX ORDER — SODIUM CHLORIDE, SODIUM LACTATE, POTASSIUM CHLORIDE, CALCIUM CHLORIDE 600; 310; 30; 20 MG/100ML; MG/100ML; MG/100ML; MG/100ML
INJECTION, SOLUTION INTRAVENOUS CONTINUOUS PRN
Status: DISCONTINUED | OUTPATIENT
Start: 2018-01-01 | End: 2018-01-01 | Stop reason: SDUPTHER

## 2018-01-01 RX ORDER — CALCIUM CARBONATE 200(500)MG
1 TABLET,CHEWABLE ORAL DAILY
Qty: 30 TABLET | Refills: 0 | Status: ON HOLD | OUTPATIENT
Start: 2018-01-01 | End: 2018-01-01

## 2018-01-01 RX ORDER — LISINOPRIL 20 MG/1
40 TABLET ORAL DAILY
Status: DISCONTINUED | OUTPATIENT
Start: 2018-01-01 | End: 2018-01-01 | Stop reason: HOSPADM

## 2018-01-01 RX ORDER — DOCUSATE SODIUM 100 MG/1
100 CAPSULE, LIQUID FILLED ORAL 2 TIMES DAILY
Status: CANCELLED | OUTPATIENT
Start: 2018-01-01

## 2018-01-01 RX ORDER — 0.9 % SODIUM CHLORIDE 0.9 %
1000 INTRAVENOUS SOLUTION INTRAVENOUS ONCE
Status: DISCONTINUED | OUTPATIENT
Start: 2018-01-01 | End: 2018-01-01

## 2018-01-01 RX ORDER — PROPOFOL 10 MG/ML
INJECTION, EMULSION INTRAVENOUS PRN
Status: DISCONTINUED | OUTPATIENT
Start: 2018-01-01 | End: 2018-01-01 | Stop reason: SDUPTHER

## 2018-01-01 RX ORDER — LORAZEPAM 2 MG/ML
0.25 INJECTION INTRAMUSCULAR EVERY 4 HOURS PRN
Status: DISCONTINUED | OUTPATIENT
Start: 2018-01-01 | End: 2018-01-01 | Stop reason: HOSPADM

## 2018-01-01 RX ORDER — LORAZEPAM 2 MG/ML
2 INJECTION INTRAMUSCULAR EVERY 30 MIN PRN
Status: DISCONTINUED | OUTPATIENT
Start: 2018-01-01 | End: 2018-01-01

## 2018-01-01 RX ORDER — MORPHINE SULFATE 4 MG/ML
4 INJECTION, SOLUTION INTRAMUSCULAR; INTRAVENOUS
Status: DISCONTINUED | OUTPATIENT
Start: 2018-01-01 | End: 2018-01-01

## 2018-01-01 RX ORDER — LORAZEPAM 2 MG/ML
0.5 INJECTION INTRAMUSCULAR EVERY 4 HOURS PRN
Status: DISCONTINUED | OUTPATIENT
Start: 2018-01-01 | End: 2018-01-01 | Stop reason: HOSPADM

## 2018-01-01 RX ORDER — ASPIRIN 81 MG/1
324 TABLET, CHEWABLE ORAL ONCE
Status: COMPLETED | OUTPATIENT
Start: 2018-01-01 | End: 2018-01-01

## 2018-01-01 RX ORDER — MORPHINE SULFATE 4 MG/ML
4 INJECTION, SOLUTION INTRAMUSCULAR; INTRAVENOUS EVERY 30 MIN PRN
Status: DISCONTINUED | OUTPATIENT
Start: 2018-01-01 | End: 2018-01-01

## 2018-01-01 RX ORDER — POLYETHYLENE GLYCOL 3350 17 G/17G
17 POWDER, FOR SOLUTION ORAL DAILY
Status: DISCONTINUED | OUTPATIENT
Start: 2018-01-01 | End: 2018-01-01 | Stop reason: HOSPADM

## 2018-01-01 RX ORDER — 0.9 % SODIUM CHLORIDE 0.9 %
250 INTRAVENOUS SOLUTION INTRAVENOUS ONCE
Status: COMPLETED | OUTPATIENT
Start: 2018-01-01 | End: 2018-01-01

## 2018-01-01 RX ORDER — FERRIC SUBSULFATE 20-22G/100
SOLUTION, NON-ORAL MISCELLANEOUS ONCE
Status: DISCONTINUED | OUTPATIENT
Start: 2018-01-01 | End: 2018-01-01 | Stop reason: SDUPTHER

## 2018-01-01 RX ORDER — PREDNISOLONE ACETATE 10 MG/ML
1 SUSPENSION/ DROPS OPHTHALMIC 4 TIMES DAILY
Qty: 1 BOTTLE | Refills: 0 | Status: ON HOLD | OUTPATIENT
Start: 2018-01-01 | End: 2018-01-01

## 2018-01-01 RX ORDER — FENTANYL CITRATE 50 UG/ML
25 INJECTION, SOLUTION INTRAMUSCULAR; INTRAVENOUS EVERY 5 MIN PRN
Status: DISCONTINUED | OUTPATIENT
Start: 2018-01-01 | End: 2018-01-01

## 2018-01-01 RX ORDER — SIMETHICONE 80 MG
80 TABLET,CHEWABLE ORAL EVERY 6 HOURS PRN
Status: CANCELLED | OUTPATIENT
Start: 2018-01-01

## 2018-01-01 RX ORDER — HYDROCODONE BITARTRATE AND ACETAMINOPHEN 5; 325 MG/1; MG/1
2 TABLET ORAL EVERY 4 HOURS PRN
Status: DISCONTINUED | OUTPATIENT
Start: 2018-01-01 | End: 2018-01-01

## 2018-01-01 RX ORDER — WATER 1000 ML/1000ML
INJECTION, SOLUTION INTRAVENOUS PRN
Status: DISCONTINUED | OUTPATIENT
Start: 2018-01-01 | End: 2018-01-01 | Stop reason: HOSPADM

## 2018-01-01 RX ORDER — ONDANSETRON 2 MG/ML
4 INJECTION INTRAMUSCULAR; INTRAVENOUS
Status: DISCONTINUED | OUTPATIENT
Start: 2018-01-01 | End: 2018-01-01

## 2018-01-01 RX ORDER — DEXTROSE MONOHYDRATE 50 MG/ML
100 INJECTION, SOLUTION INTRAVENOUS PRN
Status: DISCONTINUED | OUTPATIENT
Start: 2018-01-01 | End: 2018-01-01 | Stop reason: HOSPADM

## 2018-01-01 RX ORDER — DEXAMETHASONE SODIUM PHOSPHATE 10 MG/ML
INJECTION INTRAMUSCULAR; INTRAVENOUS PRN
Status: DISCONTINUED | OUTPATIENT
Start: 2018-01-01 | End: 2018-01-01 | Stop reason: SDUPTHER

## 2018-01-01 RX ORDER — HYDROCODONE BITARTRATE AND ACETAMINOPHEN 5; 325 MG/1; MG/1
1 TABLET ORAL EVERY 6 HOURS PRN
Status: DISCONTINUED | OUTPATIENT
Start: 2018-01-01 | End: 2018-01-01

## 2018-01-01 RX ORDER — FENTANYL CITRATE 50 UG/ML
INJECTION, SOLUTION INTRAMUSCULAR; INTRAVENOUS PRN
Status: DISCONTINUED | OUTPATIENT
Start: 2018-01-01 | End: 2018-01-01 | Stop reason: SDUPTHER

## 2018-01-01 RX ORDER — GLYCOPYRROLATE 0.2 MG/ML
0.1 INJECTION INTRAMUSCULAR; INTRAVENOUS EVERY 6 HOURS PRN
Status: DISCONTINUED | OUTPATIENT
Start: 2018-01-01 | End: 2018-01-01 | Stop reason: HOSPADM

## 2018-01-01 RX ORDER — CYCLOPENTOLATE HYDROCHLORIDE 10 MG/ML
1 SOLUTION/ DROPS OPHTHALMIC 4 TIMES DAILY
Qty: 2 ML | Refills: 0 | Status: SHIPPED | OUTPATIENT
Start: 2018-01-01 | End: 2018-01-01

## 2018-01-01 RX ORDER — OXYCODONE HYDROCHLORIDE AND ACETAMINOPHEN 5; 325 MG/1; MG/1
1 TABLET ORAL EVERY 4 HOURS PRN
Status: DISCONTINUED | OUTPATIENT
Start: 2018-01-01 | End: 2018-01-01

## 2018-01-01 RX ORDER — LIDOCAINE HYDROCHLORIDE 10 MG/ML
INJECTION, SOLUTION INFILTRATION; PERINEURAL PRN
Status: DISCONTINUED | OUTPATIENT
Start: 2018-01-01 | End: 2018-01-01 | Stop reason: SDUPTHER

## 2018-01-01 RX ORDER — LORAZEPAM 2 MG/ML
0.5 INJECTION INTRAMUSCULAR EVERY 4 HOURS PRN
Status: CANCELLED | OUTPATIENT
Start: 2018-01-01

## 2018-01-01 RX ORDER — POTASSIUM CHLORIDE 7.45 MG/ML
10 INJECTION INTRAVENOUS PRN
Status: DISCONTINUED | OUTPATIENT
Start: 2018-01-01 | End: 2018-01-01

## 2018-01-01 RX ORDER — SODIUM CHLORIDE 9 MG/ML
INJECTION, SOLUTION INTRAVENOUS CONTINUOUS
Status: DISCONTINUED | OUTPATIENT
Start: 2018-01-01 | End: 2018-01-01

## 2018-01-01 RX ORDER — ACETAMINOPHEN 160 MG/5ML
500 SOLUTION ORAL EVERY 6 HOURS PRN
Status: DISCONTINUED | OUTPATIENT
Start: 2018-01-01 | End: 2018-01-01 | Stop reason: HOSPADM

## 2018-01-01 RX ORDER — CALCIUM CARBONATE 200(500)MG
500 TABLET,CHEWABLE ORAL 3 TIMES DAILY PRN
Status: DISCONTINUED | OUTPATIENT
Start: 2018-01-01 | End: 2018-01-01 | Stop reason: HOSPADM

## 2018-01-01 RX ORDER — ONDANSETRON 4 MG/1
4 TABLET, ORALLY DISINTEGRATING ORAL EVERY 8 HOURS PRN
Status: CANCELLED | OUTPATIENT
Start: 2018-01-01

## 2018-01-01 RX ORDER — DIPHENHYDRAMINE HYDROCHLORIDE 50 MG/ML
12.5 INJECTION INTRAMUSCULAR; INTRAVENOUS
Status: DISCONTINUED | OUTPATIENT
Start: 2018-01-01 | End: 2018-01-01

## 2018-01-01 RX ORDER — LORAZEPAM 2 MG/ML
1 INJECTION INTRAMUSCULAR EVERY 30 MIN PRN
Status: DISCONTINUED | OUTPATIENT
Start: 2018-01-01 | End: 2018-01-01

## 2018-01-01 RX ORDER — PREDNISOLONE ACETATE 10 MG/ML
1 SUSPENSION/ DROPS OPHTHALMIC 4 TIMES DAILY
Status: DISCONTINUED | OUTPATIENT
Start: 2018-01-01 | End: 2018-01-01 | Stop reason: HOSPADM

## 2018-01-01 RX ORDER — LISINOPRIL 20 MG/1
20 TABLET ORAL DAILY
Status: DISCONTINUED | OUTPATIENT
Start: 2018-01-01 | End: 2018-01-01

## 2018-01-01 RX ORDER — ONDANSETRON 2 MG/ML
4 INJECTION INTRAMUSCULAR; INTRAVENOUS EVERY 8 HOURS PRN
Status: CANCELLED | OUTPATIENT
Start: 2018-01-01

## 2018-01-01 RX ORDER — MORPHINE SULFATE 2 MG/ML
2 INJECTION, SOLUTION INTRAMUSCULAR; INTRAVENOUS EVERY 30 MIN PRN
Status: DISCONTINUED | OUTPATIENT
Start: 2018-01-01 | End: 2018-01-01

## 2018-01-01 RX ORDER — POLYETHYLENE GLYCOL 3350 17 G/17G
17 POWDER, FOR SOLUTION ORAL DAILY
Qty: 527 G | Refills: 1 | Status: ON HOLD | OUTPATIENT
Start: 2018-01-01 | End: 2018-01-01

## 2018-01-01 RX ORDER — DOBUTAMINE HYDROCHLORIDE 400 MG/100ML
2.5 INJECTION INTRAVENOUS CONTINUOUS
Status: DISCONTINUED | OUTPATIENT
Start: 2018-01-01 | End: 2018-01-01

## 2018-01-01 RX ORDER — LISINOPRIL 10 MG/1
40 TABLET ORAL DAILY
Status: DISCONTINUED | OUTPATIENT
Start: 2018-01-01 | End: 2018-01-01

## 2018-01-01 RX ORDER — SODIUM CHLORIDE 9 MG/ML
INJECTION, SOLUTION INTRAVENOUS CONTINUOUS
Status: DISCONTINUED | OUTPATIENT
Start: 2018-01-01 | End: 2018-01-01 | Stop reason: HOSPADM

## 2018-01-01 RX ORDER — DEXTROSE MONOHYDRATE 25 G/50ML
12.5 INJECTION, SOLUTION INTRAVENOUS PRN
Status: DISCONTINUED | OUTPATIENT
Start: 2018-01-01 | End: 2018-01-01 | Stop reason: HOSPADM

## 2018-01-01 RX ORDER — FLUDEOXYGLUCOSE F 18 200 MCI/ML
13.38 INJECTION, SOLUTION INTRAVENOUS
Status: COMPLETED | OUTPATIENT
Start: 2018-01-01 | End: 2018-01-01

## 2018-01-01 RX ORDER — PREDNISOLONE ACETATE 10 MG/ML
1 SUSPENSION/ DROPS OPHTHALMIC 4 TIMES DAILY
Status: DISCONTINUED | OUTPATIENT
Start: 2018-01-01 | End: 2018-01-01

## 2018-01-01 RX ORDER — NICOTINE POLACRILEX 4 MG
15 LOZENGE BUCCAL PRN
Status: DISCONTINUED | OUTPATIENT
Start: 2018-01-01 | End: 2018-01-01

## 2018-01-01 RX ORDER — CALCIUM CARBONATE 200(500)MG
1 TABLET,CHEWABLE ORAL DAILY
Status: DISCONTINUED | OUTPATIENT
Start: 2018-01-01 | End: 2018-01-01

## 2018-01-01 RX ORDER — MORPHINE SULFATE 2 MG/ML
2 INJECTION, SOLUTION INTRAMUSCULAR; INTRAVENOUS
Status: CANCELLED | OUTPATIENT
Start: 2018-01-01

## 2018-01-01 RX ORDER — DOCUSATE SODIUM 100 MG/1
100 CAPSULE, LIQUID FILLED ORAL 2 TIMES DAILY PRN
Status: DISCONTINUED | OUTPATIENT
Start: 2018-01-01 | End: 2018-01-01

## 2018-01-01 RX ORDER — MORPHINE SULFATE 4 MG/ML
4 INJECTION, SOLUTION INTRAMUSCULAR; INTRAVENOUS ONCE
Status: COMPLETED | OUTPATIENT
Start: 2018-01-01 | End: 2018-01-01

## 2018-01-01 RX ORDER — DOCUSATE SODIUM 100 MG/1
100 CAPSULE, LIQUID FILLED ORAL 2 TIMES DAILY
Status: DISCONTINUED | OUTPATIENT
Start: 2018-01-01 | End: 2018-01-01 | Stop reason: HOSPADM

## 2018-01-01 RX ORDER — SIMETHICONE 80 MG
80 TABLET,CHEWABLE ORAL EVERY 6 HOURS PRN
Status: DISCONTINUED | OUTPATIENT
Start: 2018-01-01 | End: 2018-01-01 | Stop reason: HOSPADM

## 2018-01-01 RX ORDER — GLYCOPYRROLATE 0.2 MG/ML
0.1 INJECTION INTRAMUSCULAR; INTRAVENOUS EVERY 6 HOURS PRN
Qty: 1 VIAL | Refills: 0 | Status: SHIPPED | OUTPATIENT
Start: 2018-01-01

## 2018-01-01 RX ORDER — FENTANYL CITRATE 50 UG/ML
25 INJECTION, SOLUTION INTRAMUSCULAR; INTRAVENOUS EVERY 5 MIN PRN
Status: DISCONTINUED | OUTPATIENT
Start: 2018-01-01 | End: 2018-01-01 | Stop reason: HOSPADM

## 2018-01-01 RX ORDER — POTASSIUM CHLORIDE 20 MEQ/1
40 TABLET, EXTENDED RELEASE ORAL ONCE
Status: DISCONTINUED | OUTPATIENT
Start: 2018-01-01 | End: 2018-01-01 | Stop reason: HOSPADM

## 2018-01-01 RX ORDER — FERRIC SUBSULFATE 20-22G/100
SOLUTION, NON-ORAL MISCELLANEOUS PRN
Status: DISCONTINUED | OUTPATIENT
Start: 2018-01-01 | End: 2018-01-01 | Stop reason: HOSPADM

## 2018-01-01 RX ORDER — MAGNESIUM SULFATE 1 G/100ML
1 INJECTION INTRAVENOUS
Status: ACTIVE | OUTPATIENT
Start: 2018-01-01 | End: 2018-01-01

## 2018-01-01 RX ORDER — HYDROCODONE BITARTRATE AND ACETAMINOPHEN 5; 325 MG/1; MG/1
2 TABLET ORAL EVERY 4 HOURS PRN
Status: CANCELLED | OUTPATIENT
Start: 2018-01-01

## 2018-01-01 RX ORDER — METRONIDAZOLE 500 MG/1
500 TABLET ORAL EVERY 8 HOURS SCHEDULED
Status: DISCONTINUED | OUTPATIENT
Start: 2018-01-01 | End: 2018-01-01 | Stop reason: HOSPADM

## 2018-01-01 RX ORDER — MAGNESIUM HYDROXIDE 1200 MG/15ML
LIQUID ORAL CONTINUOUS PRN
Status: COMPLETED | OUTPATIENT
Start: 2018-01-01 | End: 2018-01-01

## 2018-01-01 RX ORDER — CYCLOPENTOLATE HYDROCHLORIDE 10 MG/ML
1 SOLUTION/ DROPS OPHTHALMIC 4 TIMES DAILY
Status: DISCONTINUED | OUTPATIENT
Start: 2018-01-01 | End: 2018-01-01 | Stop reason: HOSPADM

## 2018-01-01 RX ADMIN — CIPROFLOXACIN HYDROCHLORIDE 1 DROP: 3 SOLUTION/ DROPS OPHTHALMIC at 06:29

## 2018-01-01 RX ADMIN — SODIUM CHLORIDE 250 ML: 9 INJECTION, SOLUTION INTRAVENOUS at 15:39

## 2018-01-01 RX ADMIN — CEFTRIAXONE SODIUM 1 G: 1 INJECTION, POWDER, FOR SOLUTION INTRAMUSCULAR; INTRAVENOUS at 23:20

## 2018-01-01 RX ADMIN — METRONIDAZOLE 500 MG: 500 TABLET, FILM COATED ORAL at 00:29

## 2018-01-01 RX ADMIN — LISINOPRIL 40 MG: 20 TABLET ORAL at 10:15

## 2018-01-01 RX ADMIN — HYDROCHLOROTHIAZIDE 25 MG: 25 TABLET ORAL at 09:37

## 2018-01-01 RX ADMIN — PREDNISOLONE ACETATE 1 DROP: 10 SUSPENSION/ DROPS OPHTHALMIC at 14:05

## 2018-01-01 RX ADMIN — CIPROFLOXACIN HYDROCHLORIDE 1 DROP: 3 SOLUTION/ DROPS OPHTHALMIC at 09:16

## 2018-01-01 RX ADMIN — DESMOPRESSIN ACETATE 40 MG: 0.2 TABLET ORAL at 09:00

## 2018-01-01 RX ADMIN — MAGNESIUM HYDROXIDE 30 ML: 400 SUSPENSION ORAL at 17:28

## 2018-01-01 RX ADMIN — VERAPAMIL HYDROCHLORIDE 240 MG: 240 TABLET, FILM COATED, EXTENDED RELEASE ORAL at 09:11

## 2018-01-01 RX ADMIN — ACETAMINOPHEN 500 MG: 650 SOLUTION ORAL at 17:27

## 2018-01-01 RX ADMIN — ONDANSETRON 4 MG: 2 INJECTION, SOLUTION INTRAMUSCULAR; INTRAVENOUS at 15:39

## 2018-01-01 RX ADMIN — ACETAMINOPHEN 650 MG: 650 SOLUTION ORAL at 00:03

## 2018-01-01 RX ADMIN — FENTANYL CITRATE 25 MCG: 50 INJECTION, SOLUTION INTRAMUSCULAR; INTRAVENOUS at 20:09

## 2018-01-01 RX ADMIN — MORPHINE SULFATE 2 MG: 2 INJECTION, SOLUTION INTRAMUSCULAR; INTRAVENOUS at 18:08

## 2018-01-01 RX ADMIN — Medication 2 MG/HR: at 05:30

## 2018-01-01 RX ADMIN — CIPROFLOXACIN HYDROCHLORIDE 1 DROP: 3 SOLUTION/ DROPS OPHTHALMIC at 21:59

## 2018-01-01 RX ADMIN — MORPHINE SULFATE 4 MG: 4 INJECTION INTRAVENOUS at 23:51

## 2018-01-01 RX ADMIN — DOCUSATE SODIUM 100 MG: 100 CAPSULE ORAL at 09:04

## 2018-01-01 RX ADMIN — DEXAMETHASONE SODIUM PHOSPHATE 10 MG: 10 INJECTION INTRAMUSCULAR; INTRAVENOUS at 13:10

## 2018-01-01 RX ADMIN — MAGNESIUM HYDROXIDE 30 ML: 400 SUSPENSION ORAL at 08:37

## 2018-01-01 RX ADMIN — HYDROCHLOROTHIAZIDE 25 MG: 25 TABLET ORAL at 10:48

## 2018-01-01 RX ADMIN — CIPROFLOXACIN HYDROCHLORIDE 1 DROP: 3 SOLUTION/ DROPS OPHTHALMIC at 14:57

## 2018-01-01 RX ADMIN — DESMOPRESSIN ACETATE 40 MG: 0.2 TABLET ORAL at 09:37

## 2018-01-01 RX ADMIN — POLYETHYLENE GLYCOL 3350 17 G: 17 POWDER, FOR SOLUTION ORAL at 09:37

## 2018-01-01 RX ADMIN — INSULIN LISPRO 2 UNITS: 100 INJECTION, SOLUTION INTRAVENOUS; SUBCUTANEOUS at 13:08

## 2018-01-01 RX ADMIN — LEVOFLOXACIN 500 MG: 500 TABLET, FILM COATED ORAL at 09:32

## 2018-01-01 RX ADMIN — Medication 10 ML: at 10:14

## 2018-01-01 RX ADMIN — VERAPAMIL HYDROCHLORIDE 240 MG: 240 TABLET, FILM COATED, EXTENDED RELEASE ORAL at 23:59

## 2018-01-01 RX ADMIN — CIPROFLOXACIN HYDROCHLORIDE 1 DROP: 3 SOLUTION/ DROPS OPHTHALMIC at 09:37

## 2018-01-01 RX ADMIN — CEFTRIAXONE SODIUM 1 G: 1 INJECTION, POWDER, FOR SOLUTION INTRAMUSCULAR; INTRAVENOUS at 21:37

## 2018-01-01 RX ADMIN — SODIUM CHLORIDE 250 ML: 9 INJECTION, SOLUTION INTRAVENOUS at 09:33

## 2018-01-01 RX ADMIN — FENTANYL CITRATE 25 MCG: 50 INJECTION INTRAMUSCULAR; INTRAVENOUS at 13:45

## 2018-01-01 RX ADMIN — LABETALOL HCL 300 MG: 200 TABLET, FILM COATED ORAL at 09:37

## 2018-01-01 RX ADMIN — CIPROFLOXACIN HYDROCHLORIDE 1 DROP: 3 SOLUTION/ DROPS OPHTHALMIC at 23:21

## 2018-01-01 RX ADMIN — Medication 10 ML: at 09:41

## 2018-01-01 RX ADMIN — CYCLOPENTOLATE HYDROCHLORIDE 1 DROP: 10 SOLUTION/ DROPS OPHTHALMIC at 21:47

## 2018-01-01 RX ADMIN — GLIPIZIDE 10 MG: 10 TABLET ORAL at 10:14

## 2018-01-01 RX ADMIN — Medication 10 ML: at 09:01

## 2018-01-01 RX ADMIN — INSULIN LISPRO 3 UNITS: 100 INJECTION, SOLUTION INTRAVENOUS; SUBCUTANEOUS at 18:57

## 2018-01-01 RX ADMIN — LABETALOL HCL 300 MG: 200 TABLET, FILM COATED ORAL at 10:48

## 2018-01-01 RX ADMIN — DESMOPRESSIN ACETATE 40 MG: 0.2 TABLET ORAL at 09:04

## 2018-01-01 RX ADMIN — FENTANYL CITRATE 25 MCG: 50 INJECTION INTRAMUSCULAR; INTRAVENOUS at 13:30

## 2018-01-01 RX ADMIN — CIPROFLOXACIN HYDROCHLORIDE 1 DROP: 3 SOLUTION/ DROPS OPHTHALMIC at 14:38

## 2018-01-01 RX ADMIN — IOPAMIDOL 75 ML: 755 INJECTION, SOLUTION INTRAVENOUS at 10:55

## 2018-01-01 RX ADMIN — PREDNISOLONE ACETATE 1 DROP: 10 SUSPENSION/ DROPS OPHTHALMIC at 09:07

## 2018-01-01 RX ADMIN — ASPIRIN 81 MG: 81 TABLET, COATED ORAL at 10:15

## 2018-01-01 RX ADMIN — ASPIRIN 81 MG 324 MG: 81 TABLET ORAL at 18:47

## 2018-01-01 RX ADMIN — ONDANSETRON 4 MG: 2 INJECTION INTRAMUSCULAR; INTRAVENOUS at 09:09

## 2018-01-01 RX ADMIN — CIPROFLOXACIN HYDROCHLORIDE 1 DROP: 3 SOLUTION/ DROPS OPHTHALMIC at 00:29

## 2018-01-01 RX ADMIN — MORPHINE SULFATE 4 MG: 4 INJECTION INTRAVENOUS at 15:39

## 2018-01-01 RX ADMIN — PREDNISOLONE ACETATE 1 DROP: 10 SUSPENSION/ DROPS OPHTHALMIC at 09:04

## 2018-01-01 RX ADMIN — PREDNISOLONE ACETATE 1 DROP: 10 SUSPENSION/ DROPS OPHTHALMIC at 21:28

## 2018-01-01 RX ADMIN — VERAPAMIL HYDROCHLORIDE 240 MG: 240 TABLET, FILM COATED, EXTENDED RELEASE ORAL at 23:12

## 2018-01-01 RX ADMIN — Medication 10 ML: at 21:18

## 2018-01-01 RX ADMIN — METRONIDAZOLE 500 MG: 500 TABLET, FILM COATED ORAL at 09:37

## 2018-01-01 RX ADMIN — FENTANYL CITRATE 25 MCG: 50 INJECTION INTRAMUSCULAR; INTRAVENOUS at 13:15

## 2018-01-01 RX ADMIN — INSULIN LISPRO 2 UNITS: 100 INJECTION, SOLUTION INTRAVENOUS; SUBCUTANEOUS at 21:22

## 2018-01-01 RX ADMIN — PREDNISOLONE ACETATE 1 DROP: 10 SUSPENSION/ DROPS OPHTHALMIC at 12:39

## 2018-01-01 RX ADMIN — EPINEPHRINE 3 MCG/MIN: 1 INJECTION PARENTERAL at 05:50

## 2018-01-01 RX ADMIN — FENTANYL CITRATE 50 MCG: 50 INJECTION, SOLUTION INTRAMUSCULAR; INTRAVENOUS at 06:06

## 2018-01-01 RX ADMIN — INSULIN LISPRO 1 UNITS: 100 INJECTION, SOLUTION INTRAVENOUS; SUBCUTANEOUS at 14:04

## 2018-01-01 RX ADMIN — DESMOPRESSIN ACETATE 40 MG: 0.2 TABLET ORAL at 09:11

## 2018-01-01 RX ADMIN — INSULIN LISPRO 2 UNITS: 100 INJECTION, SOLUTION INTRAVENOUS; SUBCUTANEOUS at 09:22

## 2018-01-01 RX ADMIN — ONDANSETRON 4 MG: 2 INJECTION, SOLUTION INTRAMUSCULAR; INTRAVENOUS at 13:27

## 2018-01-01 RX ADMIN — LABETALOL HCL 300 MG: 200 TABLET, FILM COATED ORAL at 21:25

## 2018-01-01 RX ADMIN — TAZOBACTAM SODIUM AND PIPERACILLIN SODIUM 3.38 G: 375; 3 INJECTION, SOLUTION INTRAVENOUS at 13:14

## 2018-01-01 RX ADMIN — PREDNISOLONE ACETATE 1 DROP: 10 SUSPENSION/ DROPS OPHTHALMIC at 21:17

## 2018-01-01 RX ADMIN — LISINOPRIL 40 MG: 20 TABLET ORAL at 09:01

## 2018-01-01 RX ADMIN — LABETALOL HCL 300 MG: 200 TABLET, FILM COATED ORAL at 10:22

## 2018-01-01 RX ADMIN — TAZOBACTAM SODIUM AND PIPERACILLIN SODIUM 3.38 G: 375; 3 INJECTION, SOLUTION INTRAVENOUS at 03:48

## 2018-01-01 RX ADMIN — ASPIRIN 81 MG: 81 TABLET, COATED ORAL at 08:30

## 2018-01-01 RX ADMIN — INSULIN LISPRO 2 UNITS: 100 INJECTION, SOLUTION INTRAVENOUS; SUBCUTANEOUS at 21:23

## 2018-01-01 RX ADMIN — CEFTRIAXONE SODIUM 1 G: 1 INJECTION, POWDER, FOR SOLUTION INTRAMUSCULAR; INTRAVENOUS at 23:41

## 2018-01-01 RX ADMIN — DESMOPRESSIN ACETATE 40 MG: 0.2 TABLET ORAL at 10:15

## 2018-01-01 RX ADMIN — SODIUM CHLORIDE 30 ML: 9 INJECTION, SOLUTION INTRAVENOUS at 06:44

## 2018-01-01 RX ADMIN — METRONIDAZOLE 500 MG: 500 TABLET, FILM COATED ORAL at 17:15

## 2018-01-01 RX ADMIN — DOCUSATE SODIUM 100 MG: 100 CAPSULE ORAL at 21:24

## 2018-01-01 RX ADMIN — CIPROFLOXACIN HYDROCHLORIDE 1 DROP: 3 SOLUTION/ DROPS OPHTHALMIC at 20:02

## 2018-01-01 RX ADMIN — CYCLOPENTOLATE HYDROCHLORIDE 1 DROP: 10 SOLUTION/ DROPS OPHTHALMIC at 14:00

## 2018-01-01 RX ADMIN — CIPROFLOXACIN HYDROCHLORIDE 1 DROP: 3 SOLUTION/ DROPS OPHTHALMIC at 16:09

## 2018-01-01 RX ADMIN — SODIUM CHLORIDE: 9 INJECTION, SOLUTION INTRAVENOUS at 02:44

## 2018-01-01 RX ADMIN — LEVOFLOXACIN 500 MG: 500 TABLET, FILM COATED ORAL at 10:48

## 2018-01-01 RX ADMIN — DOCUSATE SODIUM 100 MG: 100 CAPSULE ORAL at 10:47

## 2018-01-01 RX ADMIN — FENTANYL CITRATE 25 MCG: 50 INJECTION INTRAMUSCULAR; INTRAVENOUS at 13:08

## 2018-01-01 RX ADMIN — ACETAMINOPHEN 500 MG: 650 SOLUTION ORAL at 23:21

## 2018-01-01 RX ADMIN — Medication 10 ML: at 09:00

## 2018-01-01 RX ADMIN — LISINOPRIL 40 MG: 20 TABLET ORAL at 08:32

## 2018-01-01 RX ADMIN — Medication 10 ML: at 21:33

## 2018-01-01 RX ADMIN — SODIUM CHLORIDE: 9 INJECTION, SOLUTION INTRAVENOUS at 08:11

## 2018-01-01 RX ADMIN — VERAPAMIL HYDROCHLORIDE 240 MG: 240 TABLET, FILM COATED, EXTENDED RELEASE ORAL at 21:21

## 2018-01-01 RX ADMIN — Medication 10 ML: at 09:09

## 2018-01-01 RX ADMIN — HYDROCHLOROTHIAZIDE 25 MG: 25 TABLET ORAL at 09:11

## 2018-01-01 RX ADMIN — VERAPAMIL HYDROCHLORIDE 240 MG: 240 TABLET, FILM COATED, EXTENDED RELEASE ORAL at 21:22

## 2018-01-01 RX ADMIN — HYDROCHLOROTHIAZIDE 25 MG: 25 TABLET ORAL at 09:01

## 2018-01-01 RX ADMIN — METRONIDAZOLE 500 MG: 500 TABLET, FILM COATED ORAL at 14:38

## 2018-01-01 RX ADMIN — INSULIN LISPRO 1 UNITS: 100 INJECTION, SOLUTION INTRAVENOUS; SUBCUTANEOUS at 21:26

## 2018-01-01 RX ADMIN — VERAPAMIL HYDROCHLORIDE 240 MG: 240 TABLET, FILM COATED, EXTENDED RELEASE ORAL at 21:45

## 2018-01-01 RX ADMIN — INSULIN LISPRO 2 UNITS: 100 INJECTION, SOLUTION INTRAVENOUS; SUBCUTANEOUS at 08:22

## 2018-01-01 RX ADMIN — Medication 10 ML: at 09:06

## 2018-01-01 RX ADMIN — CIPROFLOXACIN HYDROCHLORIDE 1 DROP: 3 SOLUTION/ DROPS OPHTHALMIC at 10:48

## 2018-01-01 RX ADMIN — ACETAMINOPHEN 500 MG: 650 SOLUTION ORAL at 11:32

## 2018-01-01 RX ADMIN — DESMOPRESSIN ACETATE 40 MG: 0.2 TABLET ORAL at 10:48

## 2018-01-01 RX ADMIN — LABETALOL HCL 300 MG: 200 TABLET, FILM COATED ORAL at 09:03

## 2018-01-01 RX ADMIN — LEVOFLOXACIN 500 MG: 500 TABLET, FILM COATED ORAL at 17:15

## 2018-01-01 RX ADMIN — PREDNISOLONE ACETATE 1 DROP: 10 SUSPENSION/ DROPS OPHTHALMIC at 08:22

## 2018-01-01 RX ADMIN — DOCUSATE SODIUM 100 MG: 100 CAPSULE, LIQUID FILLED ORAL at 23:30

## 2018-01-01 RX ADMIN — MORPHINE SULFATE 2 MG: 2 INJECTION, SOLUTION INTRAMUSCULAR; INTRAVENOUS at 05:36

## 2018-01-01 RX ADMIN — LABETALOL HCL 300 MG: 200 TABLET, FILM COATED ORAL at 21:22

## 2018-01-01 RX ADMIN — CIPROFLOXACIN HYDROCHLORIDE 1 DROP: 3 SOLUTION/ DROPS OPHTHALMIC at 18:55

## 2018-01-01 RX ADMIN — LISINOPRIL 40 MG: 20 TABLET ORAL at 10:48

## 2018-01-01 RX ADMIN — POTASSIUM CHLORIDE 40 MEQ: 40 SOLUTION ORAL at 21:45

## 2018-01-01 RX ADMIN — ASPIRIN 81 MG: 81 TABLET, COATED ORAL at 09:11

## 2018-01-01 RX ADMIN — ONDANSETRON 4 MG: 2 INJECTION INTRAMUSCULAR; INTRAVENOUS at 21:48

## 2018-01-01 RX ADMIN — INSULIN LISPRO 2 UNITS: 100 INJECTION, SOLUTION INTRAVENOUS; SUBCUTANEOUS at 13:45

## 2018-01-01 RX ADMIN — MORPHINE SULFATE 2 MG: 2 INJECTION, SOLUTION INTRAMUSCULAR; INTRAVENOUS at 19:36

## 2018-01-01 RX ADMIN — FENTANYL CITRATE 50 MCG: 50 INJECTION, SOLUTION INTRAMUSCULAR; INTRAVENOUS at 02:54

## 2018-01-01 RX ADMIN — Medication 10 ML: at 21:23

## 2018-01-01 RX ADMIN — SIMETHICONE 80 MG: 80 TABLET, CHEWABLE ORAL at 07:58

## 2018-01-01 RX ADMIN — LIDOCAINE HYDROCHLORIDE 40 MG: 10 INJECTION, SOLUTION INFILTRATION; PERINEURAL at 13:10

## 2018-01-01 RX ADMIN — GADOTERIDOL 11 ML: 279.3 INJECTION, SOLUTION INTRAVENOUS at 17:07

## 2018-01-01 RX ADMIN — Medication 10 ML: at 14:52

## 2018-01-01 RX ADMIN — CYCLOPENTOLATE HYDROCHLORIDE 1 DROP: 10 SOLUTION/ DROPS OPHTHALMIC at 12:39

## 2018-01-01 RX ADMIN — LEVOFLOXACIN 500 MG: 500 TABLET, FILM COATED ORAL at 10:57

## 2018-01-01 RX ADMIN — PREDNISOLONE ACETATE 1 DROP: 10 SUSPENSION/ DROPS OPHTHALMIC at 13:39

## 2018-01-01 RX ADMIN — VERAPAMIL HYDROCHLORIDE 240 MG: 240 TABLET, FILM COATED, EXTENDED RELEASE ORAL at 10:47

## 2018-01-01 RX ADMIN — CEFTRIAXONE SODIUM 1 G: 1 INJECTION, POWDER, FOR SOLUTION INTRAMUSCULAR; INTRAVENOUS at 23:38

## 2018-01-01 RX ADMIN — DOCUSATE SODIUM 100 MG: 100 CAPSULE ORAL at 09:37

## 2018-01-01 RX ADMIN — LABETALOL HCL 300 MG: 200 TABLET, FILM COATED ORAL at 21:21

## 2018-01-01 RX ADMIN — SODIUM CHLORIDE: 9 INJECTION, SOLUTION INTRAVENOUS at 02:58

## 2018-01-01 RX ADMIN — LISINOPRIL 40 MG: 20 TABLET ORAL at 09:00

## 2018-01-01 RX ADMIN — ANTACID TABLETS 500 MG: 500 TABLET, CHEWABLE ORAL at 05:24

## 2018-01-01 RX ADMIN — METRONIDAZOLE 500 MG: 500 TABLET, FILM COATED ORAL at 21:21

## 2018-01-01 RX ADMIN — HYDROCHLOROTHIAZIDE 25 MG: 25 TABLET ORAL at 10:15

## 2018-01-01 RX ADMIN — CYCLOPENTOLATE HYDROCHLORIDE 1 DROP: 10 SOLUTION/ DROPS OPHTHALMIC at 09:07

## 2018-01-01 RX ADMIN — INSULIN LISPRO 2 UNITS: 100 INJECTION, SOLUTION INTRAVENOUS; SUBCUTANEOUS at 10:48

## 2018-01-01 RX ADMIN — METRONIDAZOLE 500 MG: 500 TABLET, FILM COATED ORAL at 21:22

## 2018-01-01 RX ADMIN — POLYETHYLENE GLYCOL 3350 17 G: 17 POWDER, FOR SOLUTION ORAL at 09:05

## 2018-01-01 RX ADMIN — LISINOPRIL 40 MG: 20 TABLET ORAL at 09:10

## 2018-01-01 RX ADMIN — Medication 10 ML: at 07:58

## 2018-01-01 RX ADMIN — VERAPAMIL HYDROCHLORIDE 240 MG: 240 TABLET, FILM COATED, EXTENDED RELEASE ORAL at 09:00

## 2018-01-01 RX ADMIN — ONDANSETRON 4 MG: 4 TABLET, ORALLY DISINTEGRATING ORAL at 23:59

## 2018-01-01 RX ADMIN — INSULIN LISPRO 2 UNITS: 100 INJECTION, SOLUTION INTRAVENOUS; SUBCUTANEOUS at 09:44

## 2018-01-01 RX ADMIN — ACETAMINOPHEN 500 MG: 650 SOLUTION ORAL at 08:37

## 2018-01-01 RX ADMIN — VERAPAMIL HYDROCHLORIDE 240 MG: 240 TABLET, FILM COATED, EXTENDED RELEASE ORAL at 08:31

## 2018-01-01 RX ADMIN — ONDANSETRON 4 MG: 4 TABLET, ORALLY DISINTEGRATING ORAL at 01:59

## 2018-01-01 RX ADMIN — INSULIN LISPRO 1 UNITS: 100 INJECTION, SOLUTION INTRAVENOUS; SUBCUTANEOUS at 09:52

## 2018-01-01 RX ADMIN — DEXTROSE MONOHYDRATE 12.5 G: 25 INJECTION, SOLUTION INTRAVENOUS at 21:33

## 2018-01-01 RX ADMIN — Medication 10 ML: at 21:25

## 2018-01-01 RX ADMIN — LEVOFLOXACIN 500 MG: 500 TABLET, FILM COATED ORAL at 09:37

## 2018-01-01 RX ADMIN — INSULIN LISPRO 2 UNITS: 100 INJECTION, SOLUTION INTRAVENOUS; SUBCUTANEOUS at 13:30

## 2018-01-01 RX ADMIN — INSULIN LISPRO 1 UNITS: 100 INJECTION, SOLUTION INTRAVENOUS; SUBCUTANEOUS at 00:00

## 2018-01-01 RX ADMIN — LABETALOL HCL 300 MG: 200 TABLET, FILM COATED ORAL at 23:09

## 2018-01-01 RX ADMIN — ACETAMINOPHEN 500 MG: 650 SOLUTION ORAL at 09:51

## 2018-01-01 RX ADMIN — SODIUM CHLORIDE, POTASSIUM CHLORIDE, SODIUM LACTATE AND CALCIUM CHLORIDE: 600; 310; 30; 20 INJECTION, SOLUTION INTRAVENOUS at 13:03

## 2018-01-01 RX ADMIN — ASPIRIN 81 MG: 81 TABLET, COATED ORAL at 09:00

## 2018-01-01 RX ADMIN — ACETAMINOPHEN 500 MG: 650 SOLUTION ORAL at 05:32

## 2018-01-01 RX ADMIN — FLUDEOXYGLUCOSE F 18 13.38 MILLICURIE: 200 INJECTION, SOLUTION INTRAVENOUS at 11:19

## 2018-01-01 RX ADMIN — METOROPROLOL TARTRATE 5 MG: 5 INJECTION, SOLUTION INTRAVENOUS at 23:41

## 2018-01-01 RX ADMIN — DOCUSATE SODIUM 100 MG: 100 CAPSULE ORAL at 10:48

## 2018-01-01 RX ADMIN — SODIUM CHLORIDE 500 ML: 0.9 INJECTION, SOLUTION INTRAVENOUS at 16:18

## 2018-01-01 RX ADMIN — HYDROCODONE BITARTRATE AND ACETAMINOPHEN 1 TABLET: 5; 325 TABLET ORAL at 20:41

## 2018-01-01 RX ADMIN — GLIPIZIDE 10 MG: 10 TABLET ORAL at 08:19

## 2018-01-01 RX ADMIN — CIPROFLOXACIN HYDROCHLORIDE 1 DROP: 3 SOLUTION/ DROPS OPHTHALMIC at 07:08

## 2018-01-01 RX ADMIN — CIPROFLOXACIN HYDROCHLORIDE 1 DROP: 3 SOLUTION/ DROPS OPHTHALMIC at 05:32

## 2018-01-01 RX ADMIN — SODIUM CHLORIDE: 9 INJECTION, SOLUTION INTRAVENOUS at 10:42

## 2018-01-01 RX ADMIN — CIPROFLOXACIN HYDROCHLORIDE 1 DROP: 3 SOLUTION/ DROPS OPHTHALMIC at 11:12

## 2018-01-01 RX ADMIN — INSULIN LISPRO 2 UNITS: 100 INJECTION, SOLUTION INTRAVENOUS; SUBCUTANEOUS at 20:16

## 2018-01-01 RX ADMIN — DESMOPRESSIN ACETATE 40 MG: 0.2 TABLET ORAL at 08:30

## 2018-01-01 RX ADMIN — INSULIN LISPRO 1 UNITS: 100 INJECTION, SOLUTION INTRAVENOUS; SUBCUTANEOUS at 13:15

## 2018-01-01 RX ADMIN — VERAPAMIL HYDROCHLORIDE 240 MG: 240 TABLET, FILM COATED, EXTENDED RELEASE ORAL at 09:06

## 2018-01-01 RX ADMIN — Medication 10 ML: at 09:44

## 2018-01-01 RX ADMIN — Medication 10 ML: at 20:48

## 2018-01-01 RX ADMIN — MORPHINE SULFATE 2 MG: 2 INJECTION, SOLUTION INTRAMUSCULAR; INTRAVENOUS at 22:53

## 2018-01-01 RX ADMIN — VERAPAMIL HYDROCHLORIDE 240 MG: 240 TABLET, FILM COATED, EXTENDED RELEASE ORAL at 21:24

## 2018-01-01 RX ADMIN — PROPOFOL 150 MG: 10 INJECTION, EMULSION INTRAVENOUS at 13:10

## 2018-01-01 RX ADMIN — CYCLOPENTOLATE HYDROCHLORIDE 1 DROP: 10 SOLUTION/ DROPS OPHTHALMIC at 21:28

## 2018-01-01 RX ADMIN — CEFTRIAXONE SODIUM 1 G: 1 INJECTION, POWDER, FOR SOLUTION INTRAMUSCULAR; INTRAVENOUS at 01:44

## 2018-01-01 RX ADMIN — LABETALOL HCL 300 MG: 200 TABLET, FILM COATED ORAL at 09:01

## 2018-01-01 RX ADMIN — FENTANYL CITRATE 25 MCG: 50 INJECTION, SOLUTION INTRAMUSCULAR; INTRAVENOUS at 23:37

## 2018-01-01 RX ADMIN — Medication 10 ML: at 20:06

## 2018-01-01 RX ADMIN — LABETALOL HCL 300 MG: 200 TABLET, FILM COATED ORAL at 08:19

## 2018-01-01 RX ADMIN — METRONIDAZOLE 500 MG: 500 TABLET, FILM COATED ORAL at 14:57

## 2018-01-01 RX ADMIN — CIPROFLOXACIN HYDROCHLORIDE 1 DROP: 3 SOLUTION/ DROPS OPHTHALMIC at 15:22

## 2018-01-01 RX ADMIN — GLYCOPYRROLATE 0.1 MG: 0.2 INJECTION INTRAMUSCULAR; INTRAVENOUS at 13:53

## 2018-01-01 RX ADMIN — POLYETHYLENE GLYCOL 3350 17 G: 17 POWDER, FOR SOLUTION ORAL at 08:20

## 2018-01-01 RX ADMIN — Medication 10 ML: at 10:40

## 2018-01-01 RX ADMIN — SODIUM CHLORIDE: 9 INJECTION, SOLUTION INTRAVENOUS at 14:39

## 2018-01-01 RX ADMIN — ATORVASTATIN CALCIUM 40 MG: 80 TABLET, FILM COATED ORAL at 12:17

## 2018-01-01 RX ADMIN — ONDANSETRON 4 MG: 2 INJECTION INTRAMUSCULAR; INTRAVENOUS at 14:50

## 2018-01-01 RX ADMIN — METRONIDAZOLE 500 MG: 500 TABLET, FILM COATED ORAL at 21:24

## 2018-01-01 RX ADMIN — INSULIN LISPRO 2 UNITS: 100 INJECTION, SOLUTION INTRAVENOUS; SUBCUTANEOUS at 23:20

## 2018-01-01 RX ADMIN — METRONIDAZOLE 500 MG: 500 TABLET, FILM COATED ORAL at 15:22

## 2018-01-01 RX ADMIN — METRONIDAZOLE 500 MG: 500 TABLET, FILM COATED ORAL at 06:29

## 2018-01-01 RX ADMIN — Medication 10 ML: at 19:56

## 2018-01-01 RX ADMIN — PREDNISOLONE ACETATE 1 DROP: 10 SUSPENSION/ DROPS OPHTHALMIC at 00:01

## 2018-01-01 RX ADMIN — ANTACID TABLETS 500 MG: 500 TABLET, CHEWABLE ORAL at 08:20

## 2018-01-01 RX ADMIN — TAZOBACTAM SODIUM AND PIPERACILLIN SODIUM 3.38 G: 375; 3 INJECTION, SOLUTION INTRAVENOUS at 19:52

## 2018-01-01 RX ADMIN — MORPHINE SULFATE 2 MG: 2 INJECTION, SOLUTION INTRAMUSCULAR; INTRAVENOUS at 16:07

## 2018-01-01 RX ADMIN — METRONIDAZOLE 500 MG: 500 TABLET, FILM COATED ORAL at 10:48

## 2018-01-01 RX ADMIN — POLYETHYLENE GLYCOL 3350 17 G: 17 POWDER, FOR SOLUTION ORAL at 18:55

## 2018-01-01 RX ADMIN — PREDNISOLONE ACETATE 1 DROP: 10 SUSPENSION/ DROPS OPHTHALMIC at 23:13

## 2018-01-01 RX ADMIN — CYCLOPENTOLATE HYDROCHLORIDE 1 DROP: 10 SOLUTION/ DROPS OPHTHALMIC at 09:09

## 2018-01-01 RX ADMIN — IOHEXOL 50 ML: 240 INJECTION, SOLUTION INTRATHECAL; INTRAVASCULAR; INTRAVENOUS; ORAL at 09:05

## 2018-01-01 RX ADMIN — CEFTRIAXONE SODIUM 1 G: 1 INJECTION, POWDER, FOR SOLUTION INTRAMUSCULAR; INTRAVENOUS at 00:42

## 2018-01-01 RX ADMIN — INSULIN HUMAN 10 UNITS: 100 INJECTION, SOLUTION PARENTERAL at 05:58

## 2018-01-01 RX ADMIN — SODIUM CHLORIDE, POTASSIUM CHLORIDE, SODIUM LACTATE AND CALCIUM CHLORIDE: 600; 310; 30; 20 INJECTION, SOLUTION INTRAVENOUS at 12:32

## 2018-01-01 RX ADMIN — SENNA 17.2 MG: 8.6 TABLET, COATED ORAL at 19:47

## 2018-01-01 RX ADMIN — INSULIN LISPRO 2 UNITS: 100 INJECTION, SOLUTION INTRAVENOUS; SUBCUTANEOUS at 18:26

## 2018-01-01 RX ADMIN — DOCUSATE SODIUM 100 MG: 100 CAPSULE ORAL at 21:22

## 2018-01-01 RX ADMIN — SODIUM CHLORIDE: 9 INJECTION, SOLUTION INTRAVENOUS at 17:24

## 2018-01-01 RX ADMIN — CIPROFLOXACIN HYDROCHLORIDE 1 DROP: 3 SOLUTION/ DROPS OPHTHALMIC at 18:57

## 2018-01-01 RX ADMIN — ONDANSETRON 4 MG: 2 INJECTION INTRAMUSCULAR; INTRAVENOUS at 07:58

## 2018-01-01 RX ADMIN — Medication 10 ML: at 21:45

## 2018-01-01 RX ADMIN — ONDANSETRON 4 MG: 2 INJECTION, SOLUTION INTRAMUSCULAR; INTRAVENOUS at 05:23

## 2018-01-01 RX ADMIN — HYDROCHLOROTHIAZIDE 25 MG: 25 TABLET ORAL at 08:30

## 2018-01-01 RX ADMIN — INSULIN LISPRO 2 UNITS: 100 INJECTION, SOLUTION INTRAVENOUS; SUBCUTANEOUS at 12:40

## 2018-01-01 RX ADMIN — ASPIRIN 81 MG: 81 TABLET, CHEWABLE ORAL at 12:17

## 2018-01-01 RX ADMIN — METOROPROLOL TARTRATE 5 MG: 5 INJECTION, SOLUTION INTRAVENOUS at 09:17

## 2018-01-01 RX ADMIN — MORPHINE SULFATE 2 MG: 2 INJECTION, SOLUTION INTRAMUSCULAR; INTRAVENOUS at 12:35

## 2018-01-01 RX ADMIN — CIPROFLOXACIN HYDROCHLORIDE 1 DROP: 3 SOLUTION/ DROPS OPHTHALMIC at 21:26

## 2018-01-01 RX ADMIN — LABETALOL HCL 300 MG: 200 TABLET, FILM COATED ORAL at 09:10

## 2018-01-01 RX ADMIN — Medication 10 ML: at 22:28

## 2018-01-01 RX ADMIN — ANTACID TABLETS 500 MG: 500 TABLET, CHEWABLE ORAL at 09:02

## 2018-01-01 RX ADMIN — SODIUM CHLORIDE: 9 INJECTION, SOLUTION INTRAVENOUS at 11:32

## 2018-01-01 RX ADMIN — ASPIRIN 81 MG: 81 TABLET, COATED ORAL at 08:19

## 2018-01-01 RX ADMIN — INSULIN LISPRO 2 UNITS: 100 INJECTION, SOLUTION INTRAVENOUS; SUBCUTANEOUS at 12:51

## 2018-01-01 RX ADMIN — METRONIDAZOLE 500 MG: 500 TABLET, FILM COATED ORAL at 10:57

## 2018-01-01 RX ADMIN — PREDNISOLONE ACETATE 1 DROP: 10 SUSPENSION/ DROPS OPHTHALMIC at 18:19

## 2018-01-01 RX ADMIN — DOCUSATE SODIUM 100 MG: 100 CAPSULE, LIQUID FILLED ORAL at 22:01

## 2018-01-01 RX ADMIN — Medication 10 ML: at 21:48

## 2018-01-01 RX ADMIN — PREDNISOLONE ACETATE 1 DROP: 10 SUSPENSION/ DROPS OPHTHALMIC at 13:00

## 2018-01-01 RX ADMIN — SODIUM CHLORIDE 250 ML: 9 INJECTION, SOLUTION INTRAVENOUS at 01:37

## 2018-01-01 RX ADMIN — VERAPAMIL HYDROCHLORIDE 240 MG: 240 TABLET, FILM COATED, EXTENDED RELEASE ORAL at 10:15

## 2018-01-01 RX ADMIN — SODIUM CHLORIDE 150 ML/HR: 9 INJECTION, SOLUTION INTRAVENOUS at 22:28

## 2018-01-01 RX ADMIN — LABETALOL HCL 300 MG: 200 TABLET, FILM COATED ORAL at 23:58

## 2018-01-01 RX ADMIN — MORPHINE SULFATE 2 MG: 2 INJECTION, SOLUTION INTRAMUSCULAR; INTRAVENOUS at 14:56

## 2018-01-01 RX ADMIN — Medication 10 ML: at 21:37

## 2018-01-01 RX ADMIN — SODIUM CHLORIDE 1000 ML: 9 INJECTION, SOLUTION INTRAVENOUS at 22:04

## 2018-01-01 RX ADMIN — LISINOPRIL 40 MG: 20 TABLET ORAL at 09:37

## 2018-01-01 RX ADMIN — METRONIDAZOLE 500 MG: 500 TABLET, FILM COATED ORAL at 06:12

## 2018-01-01 ASSESSMENT — PAIN DESCRIPTION - LOCATION
LOCATION: ABDOMEN;EYE
LOCATION: ABDOMEN
LOCATION: EYE
LOCATION: ABDOMEN;LEG

## 2018-01-01 ASSESSMENT — PULMONARY FUNCTION TESTS
PIF_VALUE: 2
PIF_VALUE: 3
PIF_VALUE: 2
PIF_VALUE: 3
PIF_VALUE: 2
PIF_VALUE: 2
PIF_VALUE: 14
PIF_VALUE: 2
PIF_VALUE: 3
PIF_VALUE: 2
PIF_VALUE: 32
PIF_VALUE: 2
PIF_VALUE: 4
PIF_VALUE: 2
PIF_VALUE: 2
PIF_VALUE: 0
PIF_VALUE: 2
PIF_VALUE: 0
PIF_VALUE: 2
PIF_VALUE: 25
PIF_VALUE: 27
PIF_VALUE: 2
PIF_VALUE: 2
PIF_VALUE: 0
PIF_VALUE: 24
PIF_VALUE: 28
PIF_VALUE: 24
PIF_VALUE: 2
PIF_VALUE: 3
PIF_VALUE: 2
PIF_VALUE: 3
PIF_VALUE: 15
PIF_VALUE: 2
PIF_VALUE: 2
PIF_VALUE: 17
PIF_VALUE: 2
PIF_VALUE: 24
PIF_VALUE: 2
PIF_VALUE: 23
PIF_VALUE: 18
PIF_VALUE: 3
PIF_VALUE: 2
PIF_VALUE: 0
PIF_VALUE: 2
PIF_VALUE: 3
PIF_VALUE: 17

## 2018-01-01 ASSESSMENT — PAIN DESCRIPTION - PAIN TYPE
TYPE: ACUTE PAIN

## 2018-01-01 ASSESSMENT — PAIN SCALES - GENERAL
PAINLEVEL_OUTOF10: 0
PAINLEVEL_OUTOF10: 5
PAINLEVEL_OUTOF10: 0
PAINLEVEL_OUTOF10: 5
PAINLEVEL_OUTOF10: 0
PAINLEVEL_OUTOF10: 5
PAINLEVEL_OUTOF10: 5
PAINLEVEL_OUTOF10: 0
PAINLEVEL_OUTOF10: 2
PAINLEVEL_OUTOF10: 5
PAINLEVEL_OUTOF10: 3
PAINLEVEL_OUTOF10: 5
PAINLEVEL_OUTOF10: 3
PAINLEVEL_OUTOF10: 6
PAINLEVEL_OUTOF10: 0
PAINLEVEL_OUTOF10: 5
PAINLEVEL_OUTOF10: 5
PAINLEVEL_OUTOF10: 10
PAINLEVEL_OUTOF10: 0
PAINLEVEL_OUTOF10: 5
PAINLEVEL_OUTOF10: 0
PAINLEVEL_OUTOF10: 5
PAINLEVEL_OUTOF10: 8
PAINLEVEL_OUTOF10: 0

## 2018-01-01 ASSESSMENT — ENCOUNTER SYMPTOMS
EYE REDNESS: 0
EYE REDNESS: 1
SHORTNESS OF BREATH: 0
COLOR CHANGE: 0
SORE THROAT: 0
ALLERGIC/IMMUNOLOGIC NEGATIVE: 1
EYE ITCHING: 1
RESPIRATORY NEGATIVE: 1
COUGH: 0
GASTROINTESTINAL NEGATIVE: 1
CONSTIPATION: 1
GASTROINTESTINAL NEGATIVE: 1
ABDOMINAL PAIN: 1
CONSTIPATION: 1
RESPIRATORY NEGATIVE: 1
ALLERGIC/IMMUNOLOGIC NEGATIVE: 1
ALLERGIC/IMMUNOLOGIC NEGATIVE: 1
RESPIRATORY NEGATIVE: 1
GASTROINTESTINAL NEGATIVE: 1
SHORTNESS OF BREATH: 0
EYES NEGATIVE: 1
EYES NEGATIVE: 1
RESPIRATORY NEGATIVE: 1
EYE DISCHARGE: 0
SINUS PAIN: 0
CHEST TIGHTNESS: 0
ALLERGIC/IMMUNOLOGIC NEGATIVE: 1
CHEST TIGHTNESS: 0
NAUSEA: 1
EYES NEGATIVE: 1
CHEST TIGHTNESS: 0
SHORTNESS OF BREATH: 0

## 2018-01-01 ASSESSMENT — PAIN DESCRIPTION - DESCRIPTORS: DESCRIPTORS: DISCOMFORT

## 2018-01-01 ASSESSMENT — PAIN - FUNCTIONAL ASSESSMENT: PAIN_FUNCTIONAL_ASSESSMENT: 0-10

## 2018-01-01 ASSESSMENT — PAIN DESCRIPTION - ORIENTATION
ORIENTATION: LEFT
ORIENTATION: RIGHT;LEFT

## 2018-06-16 PROBLEM — R97.1 ELEVATED CA-125: Status: ACTIVE | Noted: 2018-01-01

## 2018-06-16 PROBLEM — N95.0 POSTMENOPAUSAL BLEEDING: Status: ACTIVE | Noted: 2018-01-01

## 2018-06-16 PROBLEM — N39.0 URINARY TRACT INFECTION WITHOUT HEMATURIA: Status: ACTIVE | Noted: 2018-01-01

## 2018-06-16 PROBLEM — N93.9 VAGINAL BLEEDING: Status: ACTIVE | Noted: 2018-01-01

## 2018-06-16 PROBLEM — R53.82 CHRONIC FATIGUE: Status: ACTIVE | Noted: 2018-01-01

## 2018-06-16 PROBLEM — D64.9 ANEMIA: Status: ACTIVE | Noted: 2018-01-01

## 2018-06-16 PROBLEM — R19.00 PELVIC MASS: Status: ACTIVE | Noted: 2018-01-01

## 2018-06-16 PROBLEM — R97.8 ELEVATED CA 19-9 LEVEL: Status: ACTIVE | Noted: 2018-01-01

## 2018-06-26 PROBLEM — E11.39: Status: ACTIVE | Noted: 2018-01-01

## 2018-07-05 NOTE — TELEPHONE ENCOUNTER
received approval for transportation to radiology appointments starting 7/9. [facility] ([facility]) with  times around 12:30pm for 1:30pm appointments.  called to relay information but patient was not at home.  left message urging her to call when she gets a chance.  received call from [address] [address] [address] (sister).  relayed transportation information and gave contact information for Dwayne Vargas and Kiddie Kist.

## 2018-07-09 PROBLEM — R77.8 TROPONIN LEVEL ELEVATED: Status: ACTIVE | Noted: 2018-01-01

## 2018-07-09 PROBLEM — R77.8 ELEVATED TROPONIN: Status: ACTIVE | Noted: 2018-01-01

## 2018-07-09 PROBLEM — D72.829 LEUKOCYTOSIS: Status: ACTIVE | Noted: 2018-01-01

## 2018-07-09 NOTE — ED PROVIDER NOTES
[address]  ED  eMERGENCY dEPARTMENT eNCOUnter  Resident    Pt Name: Clifton Best  MRN: [id number]  [place] 1945  Date of evaluation: 7/9/2018  PCP:  Iram Schmidt MD    CHIEF COMPLAINT     Abdominal pain      HISTORY OF PRESENT ILLNESS    Clifton Best is a 71 y.o. female who presents To the [facility] emergency room for lower abdominal pain, fatigue, weight loss, constipation. She is status post 1 month diagnosis of uterine/ovarian cancer. She was scheduled to receive palliative radiation today. When the radiation oncologist that the mass had rapidly changed. She has had worsening lower abdominal pain for the past 3 days. She has not had a bowel movement in several days. She is also been feeling a little bit nauseous she states. She's had a 10 pound weight loss. She is also feeling very fatigued. She denies any chest pain, fevers, leg swelling. She states she is having vaginal bleeding as well, this is something that is chronic for her but has gotten worse. REVIEW OF SYSTEMS       Review of Systems   Constitutional: Positive for activity change and appetite change. Negative for chills and fever. HENT: Negative for sinus pain and sore throat. Eyes: Negative for discharge and redness. Respiratory: Negative for chest tightness and shortness of breath. Cardiovascular: Negative for chest pain and palpitations. Gastrointestinal: Positive for abdominal pain, constipation (No bowel movement in several days.) and nausea. Genitourinary: Negative for dysuria and flank pain. Musculoskeletal: Negative for arthralgias and myalgias. Skin: Negative for color change and rash. Neurological: Negative for weakness and headaches. PAST MEDICAL HISTORY    has a past medical history of Anxiety; Diabetes ([address] Hyperlipidemia; Hypertension; and Osteoarthritis.     SURGICAL HISTORY      has a past surgical history that includes eye surgery; partial hysterectomy (cervix not

## 2018-07-09 NOTE — ED NOTES
Report received from [facility]. Pt resting on stretcher. NAD. Will continue to monitor.       Citlaly Orozco RN  07/09/18 [id number]

## 2018-07-09 NOTE — ED NOTES
Pt to ER with family with c/o diffuse abdominal pain with constipation and decreased appetite. Pt recently diagnosed with cancer of the uterus with heavy vaginal bleeding d/t the size of the tumor. Pt was sent here by her Oncologist. Pt was to have radiation today but was told by her doctor she has free air in her abdomen and was unable to do the radiation. Denies N/V/fever. Placed on full continuous monitor, no acute distress noted, rr even and NL. Will continue to monitor.      Paddy Cassidy RN  07/09/18 [id number]

## 2018-07-09 NOTE — TELEPHONE ENCOUNTER
Pt's sister, Urban Owusu, called in requesting to speak with writer r/t pt. Upon reviewing [employer], noted pt's database indicates okay to speak with Nilesh. Per Nilesh pt noted depressed, poor appetite, difficult to get OOB, increased vaginal bleeding & pt stating does not know if has cancer r/t waiting on results. Nilesh stated attempts to get pt to drink supplements & pt unwilling. Nilesh voiced concerns r/t pt possibly needing more care @ rehab facility. Instructed Nilesh pt to see RO MD today for OTV, encouraged Nilesh to attend OTV, & encouraged to write all concerns down on paper to discuss with MD.  Urmila Kim will update RO nurse. Nilesh verbalized understanding & thanked writer. Spoke with Dyan Koo., RO/HS, updated on conversation with Nilesh. Laurent Cole stated will update Dr. Nilsa Parada & call Faina Mishra back with outcome after OTV. Will continue to follow.

## 2018-07-10 PROBLEM — R10.30 LOWER ABDOMINAL PAIN: Status: ACTIVE | Noted: 2018-01-01

## 2018-07-10 PROBLEM — K52.0 RADIATION COLITIS: Status: ACTIVE | Noted: 2018-01-01

## 2018-07-10 NOTE — CONSULTS
1/12/17  Yes Phillis Opitz, MD     Current Facility-Administered Medications   Medication Dose Route Frequency Provider Last Rate Last Dose    aspirin EC tablet 81 mg  81 mg Oral Daily Milka Spre, APRN - CNS   81 mg at 07/10/18 0657    atorvastatin (LIPITOR) tablet 40 mg  40 mg Oral Daily Milka Favre, APRN - CNS        calcium carbonate (TUMS) chewable tablet 500 mg  1 tablet Oral Daily Milka Favre, APRN - CNS   500 mg at 07/10/18 0820    docusate sodium (COLACE) capsule 100 mg  100 mg Oral BID PRN Milka Favre, APRN - CNS        glipiZIDE (GLUCOTROL) tablet 10 mg  10 mg Oral BID AC Milka Favre, APRN - CNS   10 mg at 07/10/18 0819    labetalol (NORMODYNE) tablet 300 mg  300 mg Oral BID Milka Favre, APRN - CNS   300 mg at 07/10/18 0238    ondansetron (ZOFRAN-ODT) disintegrating tablet 4 mg  4 mg Oral Q8H PRN Milka Favre, APRN - CNS   4 mg at 07/09/18 2359    polyethylene glycol (GLYCOLAX) packet 17 g  17 g Oral Daily Milka Favre, APRN - CNS   17 g at 07/10/18 0820    prednisoLONE acetate (PRED FORTE) 1 % ophthalmic suspension 1 drop  1 drop Left Eye 4x Daily Milka Favre, APRN - CNS   1 drop at 07/10/18 8685    verapamil (CALAN SR) extended release tablet 240 mg  240 mg Oral BID Milka Favre, APRN - CNS   240 mg at 07/09/18 2359    0.9 % sodium chloride infusion   Intravenous Continuous Milka Favre, APRN -  mL/hr at 07/09/18 2228 150 mL/hr at 07/09/18 2228    sodium chloride flush 0.9 % injection 10 mL  10 mL Intravenous 2 times per day Milka Favre, APRN - CNS   10 mL at 07/09/18 2228    sodium chloride flush 0.9 % injection 10 mL  10 mL Intravenous PRN Milka Favre, APRN - CNS        acetaminophen (TYLENOL) tablet 650 mg  650 mg Oral Q4H PRN Imlka Favre, APRN - CNS        morphine (PF) injection 2 mg  2 mg Intravenous Q2H PRN Milka Favre, APRN - CNS        Or    morphine (PF) injection 4 mg  4 mg Intravenous Q2H PRN Demetra Favre, schizophrenia. No change in mood or affect. · Hematologic: No history of bleeding tendency. No bruises or ecchymosis. No history of clotting problems. · Infectious disease: No fever, chills or frequent infections. · Endocrine: No problems with obesity. No polydipsia or polyuria. No temperature intolerance. · Neurologic: No headaches or dizziness. No weakness or numbness of the extremities. No changes in balance, coordination,  memory, mentation, behavior. · Allergic/Immunologic: No nasal congestion or hives. No repeated infections.        PHYSICAL EXAM:      /61   Pulse 110   Temp 97.6 °F (36.4 °C) (Oral)   Resp 16   Ht 5' 3\" (1.6 m)   Wt 127 lb 12.8 oz (58 kg)   SpO2 95%   BMI 22.64 kg/m²    Temp (24hrs), Av.8 °F (36.6 °C), Min:97.4 °F (36.3 °C), Max:98.3 °F (36.8 °C)      General appearance - well appearing, not in pain or distress  Mental status - good mood, alert and oriented  Eyes - pupils equal and reactive, extraocular eye movements intact  Ears - bilateral TM's and external ear canals normal  Nose - normal and patent, no erythema, discharge or polyps  Mouth - mucous membranes moist, pharynx normal without lesions  Neck - supple, no significant adenopathy  Lymphatics - no palpable lymphadenopathy, no hepatosplenomegaly  Chest - clear to auscultation, no wheezes, rales or rhonchi, symmetric air entry  Heart - normal rate, regular rhythm, normal S1, S2, no murmurs, rubs, clicks or gallops  Abdomen - soft, nontender, nondistended, no masses or organomegaly  Neurological - alert, oriented, normal speech, no focal findings or movement disorder noted  Musculoskeletal - no joint tenderness, deformity or swelling  Extremities - peripheral pulses normal, no pedal edema, no clubbing or cyanosis  Skin - normal coloration and turgor, no rashes, no suspicious skin lesions noted           DATA:      Labs:       CBC:   Recent Labs      18   1532  07/10/18   0006  07/10/18   0549   WBC  17.2* --   17.2*   HGB  7.8*  6.8*  8.7*   HCT  25.7*  22.5*  29.4*   PLT  456*   --   373     BMP:   Recent Labs      07/09/18   1532  07/10/18   0549   NA  133*  131*   K  3.5*  3.5*   CO2  20  20   BUN  9  9   CREATININE  0.44*  0.41*   LABGLOM  >60  >60   GLUCOSE  167*  180*     PT/INR:   Recent Labs      07/10/18   0549   PROTIME  12.3*   INR  1.2     APTT:No results for input(s): APTT in the last 72 hours. LIVER PROFILE:  Recent Labs      07/09/18   1532  07/10/18   0549   AST  18  16   ALT  10  9   LABALBU  2.0*  1.6*     IMPRESSION:    Primary Problem  <principal problem not specified>    Active Hospital Problems    Diagnosis Date Noted    Leukocytosis [D72.829] 07/09/2018    Elevated troponin [R74.8] 07/09/2018    Troponin level elevated [R74.8] 07/09/2018       RECOMMENDATIONS:    1. Medical management as per primary. 2. Continue to monitor Hgb , transfuse for Hgb <7  3. Radiation oncology consulted. 4. We will continue to follow   5. Will consult Gyn Onc   6. Thank you for allowing us to participate in the care of this patient. Partha No MD      Department of Internal Medicine  [address] [place] [address]         7/10/2018, 10:02 AM    Attending Physician Statement  The patient was seen and examined during rounds, I have discussed the care of [facility], including pertinent history and exam findings with the resident. I have reviewed the key elements of all parts of the encounter with the resident. I agree with the assessment, and status of the problem list as documented.    Additional assessment/ plan       Electronically signed by   Eduardo Roberto MD    on 7/10/2018 at 11:36 PM

## 2018-07-10 NOTE — PROGRESS NOTES
Writer contacted [employer] NP about pts current assessment.& Hgb level (6.8). New orders received to transfuse 1 unit of RBC (Re:orders). Will continue to monitor.  Electronically signed by Denise Serna RN on 7/10/18 at 7:04 AM

## 2018-07-10 NOTE — TELEPHONE ENCOUNTER
LATE ENTRY: 7/9/18 Dread Castro., MHRO/HS, notified Teresa Hernandez pt sent to [facility] ER.    7/10/18 Upon reviewing EPIC noted pt currently admitted to [facility]. Will continue to follow.

## 2018-07-10 NOTE — H&P
moderate amount    Past Medical History:     Past Medical History:   Diagnosis Date    Anxiety     Diabetes ([address]     Hyperlipidemia     Hypertension     Osteoarthritis         Past Surgical History:     Past Surgical History:   Procedure Laterality Date    DILATION AND CURETTAGE OF UTERUS  06/22/2018    DILATATION AND CURETTAGE, PELVIC EXAM UNDER ANESTHESIA, CYSTOSCOPY (N/A )    EYE SURGERY      PARTIAL HYSTERECTOMY      AR BIOPSY CERVIX, 1 OR MORE, OR EXCISION OF LESION N/A 6/22/2018    PUNCH BIOPSY VAGINAL LESION performed by Jose Maurice MD at [address] DILATION/CURETTAGE,DIAGNOSTIC N/A 6/22/2018    PELVIC EXAM UNDER ANESTHESIA, CYSTOSCOPY performed by Jose Maurice MD at [facility] N/A 6/22/2018    ANAL PROCTO SIGMOIDOSCOPY FLEXIBLE performed by Jose Maurice MD at [place] [address]  06/22/2018    ANAL PROCTO SIGMOIDOSCOPY FLEXIBLE         Medications Prior to Admission:     Prior to Admission medications    Medication Sig Start Date End Date Taking?  Authorizing Provider   polyethylene glycol (GLYCOLAX) packet Take 17 g by mouth daily 6/28/18 7/28/18 Yes Joshua Metz MD   prednisoLONE acetate (PRED FORTE) 1 % ophthalmic suspension Place 1 drop into the left eye 4 times daily 6/27/18  Yes Joshua Metz MD   docusate sodium (COLACE) 100 MG capsule Take 1 capsule by mouth 2 times daily as needed for Constipation 6/27/18  Yes Joshua Metz MD   ondansetron (ZOFRAN ODT) 4 MG disintegrating tablet Take 1 tablet by mouth every 8 hours as needed for Nausea or Vomiting 6/27/18  Yes Joshua Metz MD   calcium carbonate (ANTACID) 500 MG chewable tablet Take 1 tablet by mouth daily 6/27/18 7/27/18 Yes Joshua Metz MD   atorvastatin (LIPITOR) 40 MG tablet Take 1 tablet by mouth daily 1/12/17  Yes Phillis Opitz, MD   verapamil (CALAN SR) 240 MG extended release tablet Take 1 tablet by mouth 2 times daily 1/12/17  Yes Phillis Opitz, MD   aspirin EC 81 MG EC tablet Take 1 tablet by mouth daily 1/12/17  Yes Daisy Lazo MD   labetalol (NORMODYNE) 300 MG tablet Take 1 tablet by mouth 2 times daily 1/12/17  Yes Daisy Lazo MD   hydrochlorothiazide (HYDRODIURIL) 25 MG tablet Take 1 tablet by mouth daily 1/12/17  Yes Daisy Lazo MD   lisinopril (PRINIVIL;ZESTRIL) 40 MG tablet Take 1 tablet by mouth daily 1/12/17  Yes Daisy Lazo MD   metFORMIN (GLUCOPHAGE) 1000 MG tablet Take 1 tablet by mouth 2 times daily (with meals) 1/12/17  Yes Daisy Lazo MD   glipiZIDE (GLUCOTROL) 10 MG tablet Take 1 tablet by mouth 2 times daily (before meals) 1/12/17  Yes Daisy Lazo MD        Allergies:     Seasonal and Januvia [sitagliptin]    Social History:     Tobacco:    reports that she has never smoked. She does not have any smokeless tobacco history on file. Alcohol:      reports that she does not drink alcohol. Drug Use:  reports that she does not use drugs. Family History:     Family History   Problem Relation Age of Onset    Heart Disease Mother     Cancer Father        Review of Systems:     Positive and Negative as described in HPI. CONSTITUTIONAL:  negative for fevers, chills, sweats,+ fatigue,   HEENT:  negative for vision, hearing changes, runny nose, throat pain  RESPIRATORY:  negative for shortness of breath, cough, congestion, wheezing.   CARDIOVASCULAR:  negative for chest pain, palpitations  GASTROINTESTINAL:  negative for nausea, vomiting, diarrhea, constipation, change in bowel habits, +abdominal pain ,+ small rectal bleed today  GENITOURINARY:  negative for difficulty of urination, burning with urination, frequency   INTEGUMENT:  negative for rash, skin lesions, easy bruising   HEMATOLOGIC/LYMPHATIC:  negative for swelling/edema   ALLERGIC/IMMUNOLOGIC:  negative for urticaria , itching  ENDOCRINE:  negative increase in drinking, increase in urination, hot or cold intolerance  MUSCULOSKELETAL:  negative joint pains, muscle aches, swelling of Value Ref Range    Troponin T 0.07 (H) <0.03 ng/mL    Troponin Interp         POC Glucose Fingerstick    Collection Time: 07/09/18 11:37 PM   Result Value Ref Range    POC Glucose 161 (H) 65 - 105 mg/dL   Hemoglobin    Collection Time: 07/10/18 12:06 AM   Result Value Ref Range    Hemoglobin 6.8 (LL) 11.9 - 15.1 g/dL   Troponin    Collection Time: 07/10/18 12:06 AM   Result Value Ref Range    Troponin T 0.04 (H) <0.03 ng/mL    Troponin Interp         Type and Screen    Collection Time: 07/10/18 12:06 AM   Result Value Ref Range    Expiration Date 07/13/2018     Arm Band Number [id number]     ABO/Rh O POSITIVE     Antibody Screen NEGATIVE     Unit Number [id number]     Product Code Leukocyte Reduced Red Cell     Unit Divison 0     Dispense Status ISSUED     Transfusion Status OK TO TRANSFUSE     Crossmatch Result COMPATIBLE    Blood Bank Specimen    Collection Time: 07/10/18 12:06 AM   Result Value Ref Range    Blood Bank Specimen NOT REPORTED    Hematocrit    Collection Time: 07/10/18 12:06 AM   Result Value Ref Range    Hematocrit 22.5 (L) 36.3 - 47.1 %   POC Glucose Fingerstick    Collection Time: 07/10/18  4:58 AM   Result Value Ref Range    POC Glucose 211 (H) 65 - 105 mg/dL   CBC    Collection Time: 07/10/18  5:49 AM   Result Value Ref Range    WBC 17.2 (H) 3.5 - 11.3 k/uL    RBC 3.47 (L) 3.95 - 5.11 m/uL    Hemoglobin 8.7 (L) 11.9 - 15.1 g/dL    Hematocrit 29.4 (L) 36.3 - 47.1 %    MCV 84.7 82.6 - 102.9 fL    MCH 25.1 (L) 25.2 - 33.5 pg    MCHC 29.6 28.4 - 34.8 g/dL    RDW 18.9 (H) 11.8 - 14.4 %    Platelets 459 534 - 748 k/uL    MPV 9.2 8.1 - 13.5 fL    NRBC Automated 0.0 0.0 per 100 WBC   Protime-INR    Collection Time: 07/10/18  5:49 AM   Result Value Ref Range    Protime 12.3 (H) 9.0 - 12.0 sec    INR 1.2    Comprehensive Metabolic Panel w/ Reflex to MG    Collection Time: 07/10/18  5:49 AM   Result Value Ref Range    Glucose 180 (H) 70 - 99 mg/dL    BUN 9 8 - 23 mg/dL    CREATININE 0.41 (L) 0.50 - 0.90 mg/dL    Bun/Cre Ratio NOT REPORTED 9 - 20    Calcium 7.7 (L) 8.6 - 10.4 mg/dL    Sodium 131 (L) 135 - 144 mmol/L    Potassium 3.5 (L) 3.7 - 5.3 mmol/L    Chloride 100 98 - 107 mmol/L    CO2 20 20 - 31 mmol/L    Anion Gap 11 9 - 17 mmol/L    Alkaline Phosphatase 142 (H) 35 - 104 U/L    ALT 9 5 - 33 U/L    AST 16 <32 U/L    Total Bilirubin 0.49 0.3 - 1.2 mg/dL    Total Protein 6.1 (L) 6.4 - 8.3 g/dL    Alb 1.6 (L) 3.5 - 5.2 g/dL    Albumin/Globulin Ratio 0.4 (L) 1.0 - 2.5    GFR Non-African American >60 >60 mL/min    GFR African American >60 >60 mL/min    GFR Comment          GFR Staging NOT REPORTED    Magnesium    Collection Time: 07/10/18  5:49 AM   Result Value Ref Range    Magnesium 2.0 1.6 - 2.6 mg/dL   Troponin    Collection Time: 07/10/18  5:49 AM   Result Value Ref Range    Troponin T <0.03 <0.03 ng/mL    Troponin Interp         Hemoglobin    Collection Time: 07/10/18 11:55 AM   Result Value Ref Range    Hemoglobin 8.6 (L) 11.9 - 15.1 g/dL       Imaging/Diagnostics:  CXR  *Left lower lobe airspace disease.  Right lower lobe atelectasis.  Repeat   chest x-ray after therapy is recommended to confirm response of therapy and   resolution. CT abdomen  *No evidence of small bowel obstruction. *Interval enlargement of the patient's pelvic mass now measuring 17.9 x 10.5   x 21.0 cm with interval progression of the fluid component with an air-fluid   level seen.  Findings are consistent the patient's known malignancy. Superimposed infectious process or possibly Fistula development cannot be   totally excluded. *Small bilateral pleural effusions.            Assessment :      Primary Problem  Lower abdominal pain    Active Hospital Problems    Diagnosis Date Noted    Lower abdominal pain [R10.30] 07/10/2018     Priority: High    Leukocytosis [D72.829] 07/09/2018     Priority: High    Uterine neoplasm [D49.59]      Priority: High    Low hemoglobin [D64.9] 06/16/2018     Priority: High    Constipation

## 2018-07-11 NOTE — PROGRESS NOTES
23.90 kg/m²   Temp (24hrs), Av.9 °F (36.6 °C), Min:97.4 °F (36.3 °C), Max:98.7 °F (37.1 °C)    Recent Labs      07/10/18   2233  18   0326  18   0441  18   0905   POCGLU  124*  141*  528*  199*       I/O (24Hr): Intake/Output Summary (Last 24 hours) at 18 1253  Last data filed at 18 0826   Gross per 24 hour   Intake          2501.67 ml   Output                0 ml   Net          2501.67 ml       Labs:    Hematology:  Recent Labs      07/10/18   0549   07/10/18   2241  18   0403  18   1221   WBC  17.2*   --    --   29.4*  22.9*   HGB  8.7*   < >  7.8*  7.5*  9.9*   HCT  29.4*   --    --   24.9*  31.3*   PLT  373   --    --   315  361   INR  1.2   --    --    --    --     < > = values in this interval not displayed.      Chemistry:  Recent Labs      18   1532  07/10/18   0549  07/10/18   2241  18   0403  18   0441   NA  133*  131*   --   131*   --    K  3.5*  3.5*  4.2  5.7*   --    CL  96*  100   --   99   --    CO2  20  20   --   18*   --    GLUCOSE  167*  180*   --   136*   --    BUN  9  9   --   11   --    CREATININE  0.44*  0.41*   --   0.63  0.67   MG   --   2.0   --   2.0   --    ANIONGAP  17  11   --   14   --    LABGLOM  >60  >60   --   >60  >60   GFRAA  >60  >60   --   >60   --    CALCIUM  8.2*  7.7*   --   7.8*   --    PHOS   --    --    --   3.8   --      Recent Labs      18   1532  07/10/18   0549   PROT  6.7  6.1*   LABALBU  2.0*  1.6*   AST  18  16   ALT  10  9   ALKPHOS  168*  142*   BILITOT  0.85  0.49         Lab Results   Component Value Date/Time    SPECIAL NOT REPORTED 2018 10:20 AM    SPECIAL NOT REPORTED 2018 10:20 AM     Lab Results   Component Value Date/Time    CULTURE (A) 2018 10:20 AM     BACTEROIDES DISTASONIS BETA LACTAMASE NEGATIVE SCANT GROWTH    CULTURE (A) 2018 10:20 AM     PREVOTELLA (BACTEROIDES) MELANINOGENICA BETA LACTAMASE POSITIVE SCANT GROWTH    CULTURE MICROMONAS MICROS SCANT GROWTH (A) 06/23/2018 10:20 AM    CULTURE NO AEROBIC ORGANISMS ISOLATED (A) 06/23/2018 10:20 AM       Lab Results   Component Value Date    POCPH 7.299 07/11/2018    POCPCO2 50.0 07/11/2018    POCPO2 123.4 07/11/2018    POCHCO3 24.6 07/11/2018    NBEA 2 07/11/2018    PBEA NOT REPORTED 07/11/2018    UKD6LLB 26 07/11/2018    HCWO7HRP 98 07/11/2018    FIO2 100.0 07/11/2018       Radiology:  CXR  *Left lower lobe airspace disease.  Right lower lobe atelectasis.  Repeat   chest x-ray after therapy is recommended to confirm response of therapy and   resolution.          CT abdomen  *No evidence of small bowel obstruction. *Interval enlargement of the patient's pelvic mass now measuring 17.9 x 10.5   x 21.0 cm with interval progression of the fluid component with an air-fluid   level seen.  Findings are consistent the patient's known malignancy. Superimposed infectious process or possibly Fistula development cannot be   totally excluded.    *Small bilateral pleural effusions.            Physical Examination:        General appearance:  Intubated and sedated, currently on pressors support  Mental Status:  Unable to obtain  Lungs:  Conducted sounds   Heart:  regular rate and rhythm, no murmur  Abdomen:  soft, nontender, nondistended, normal bowel sounds, + fullness in  lower abdomen,   Extremities:  no edema, redness, tenderness in the calves  Skin:  no gross lesions, rashes, induration    Assessment:        Primary Problem  Acute hypoxemic respiratory failure following cardiac arrest  Shock due to above  Lower abdominal pain  Uterine neoplasm-Mixed mullerian tumor   Active Hospital Problems    Diagnosis Date Noted    Lower abdominal pain [R10.30] 07/10/2018     Priority: High    Leukocytosis [D72.829] 07/09/2018     Priority: High    Uterine neoplasm [D49.59]      Priority: High    Low hemoglobin [D64.9] 06/16/2018     Priority: High    Constipation [K59.00]      Priority: Medium    Anxiety [F41.9]      Priority: Medium

## 2018-07-11 NOTE — CARE COORDINATION
Pt now [facility]. Spoke with family. They would like referral to [facility].

## 2018-07-11 NOTE — PROGRESS NOTES
Patient was calling out for help. The writer found the patient lying on side saying she was needed help. Writer checked LOC, initially patient was oriented x4. Patient began to start mumble and not make sense. Patient began foaming at the mouth and started to destat. Writer asked Respiratory therapist to suction patient and then she stopped breathing. Writer called a [employer]. Refer to paper charting for [employer] documentation.

## 2018-07-11 NOTE — H&P
was intubated as described in intubation note. Patient regained pulses for a short time, but lost them again, was again in PEA arrest, and received several rounds of ACLS. During this time patient received two amps of bicarbonate, one gram of calcium gluconate, and multiple rounds of epinephrine. Patient ultimately achieved ROSC. An EKG was obtained and was unremarkable. Labs were obtained and revealed hyperkalemia, with K+ 5.7. Patient was given dextrose and insulin for the hyperkalemia. She was persistently hypotensive and push-dose epinephrine was made and given until a vasopressor drip arrived. This was given at 10mcg every 2-5 minutes as directed by the physician. A central line was placed as described in the procedure note. An epinephrine drip and a levophed drip were initiated. Please see code documentation for exact medication doses and timing. Patient's family updated. Patient has no spouse or children. She has several brothers and sisters who are her next of kin. Discussed patient's condition with one of her sisters on the phone; and another sister in person shortly prior to transfer to ICU. Patient's sisters indicate that patient has no living will and did not make her exact wishes known about code status. They would like time to consider code status and are open to this discussion once patient is in ICU. Patient admitted to the ICU for further evaluation and care.       SUPPORT DEVICES: [x] Ventilator [] BIPAP  [] Nasal Cannula [] Room Air    VENT SETTINGS (Comprehensive) (if applicable):      Vent Information  Ventilator Stopped: Yes (extubated to 3 l/n/c)  Vent Type: Servo i  Vent Mode: PRVC  Vt Ordered: 420 mL  Rate Set: 18 bmp  FiO2 : 50 %  Sensitivity: 5  PEEP/CPAP: 5  I Time/ I Time %: 0.9 s  Humidification Source: HME  Additional Respiratory  Assessments  Pulse: 72  Resp: 18  SpO2: 99 %  End Tidal CO2: 32 (%)  Position: Supine  Humidification Source: HME  Oral Care Completed?: Yes  Oral Care: Mouthwash, Lip moisturizer applied  Subglottic Suction Done?: Yes          Past Medical History:        Diagnosis Date    Anxiety     Diabetes ([facility] [address]     Hyperlipidemia     Hypertension     Osteoarthritis        Past Surgical History:        Procedure Laterality Date    DILATION AND CURETTAGE OF UTERUS  06/22/2018    DILATATION AND CURETTAGE, PELVIC EXAM UNDER ANESTHESIA, CYSTOSCOPY (N/A )    EYE SURGERY      PARTIAL HYSTERECTOMY      NE BIOPSY CERVIX, 1 OR MORE, OR EXCISION OF LESION N/A 6/22/2018    PUNCH BIOPSY VAGINAL LESION performed by Davy Saba MD at [address] [place] [address] DILATION/CURETTAGE,DIAGNOSTIC N/A 6/22/2018    PELVIC EXAM UNDER ANESTHESIA, CYSTOSCOPY performed by Davy Saba MD at [place] [address] 6/22/2018    ANAL PROCTO SIGMOIDOSCOPY FLEXIBLE performed by Davy Saba MD at [address] [place] [address]  06/22/2018    ANAL PROCTO SIGMOIDOSCOPY FLEXIBLE        Allergies: Allergies   Allergen Reactions    Seasonal Other (See Comments)     Allergic rhinitis    Januvia [Sitagliptin] Anxiety         Home Meds:   Prior to Admission medications    Medication Sig Start Date End Date Taking?  Authorizing Provider   polyethylene glycol (GLYCOLAX) packet Take 17 g by mouth daily 6/28/18 7/28/18 Yes Chidi Ramirez MD   prednisoLONE acetate (PRED FORTE) 1 % ophthalmic suspension Place 1 drop into the left eye 4 times daily 6/27/18  Yes Chidi Ramirez MD   docusate sodium (COLACE) 100 MG capsule Take 1 capsule by mouth 2 times daily as needed for Constipation 6/27/18  Yes Chidi Ramirez MD   ondansetron (ZOFRAN ODT) 4 MG disintegrating tablet Take 1 tablet by mouth every 8 hours as needed for Nausea or Vomiting 6/27/18  Yes Chidi Ramirez MD   calcium carbonate (ANTACID) 500 MG chewable tablet Take 1 tablet by mouth daily 6/27/18 7/27/18 Yes Chidi Ramirez MD   atorvastatin (LIPITOR) 40 MG tablet Take 1 tablet by mouth daily 1/12/17  Yes Una Sy, BILITOT  0.49   LABALBU  1.6*     Pancreatic functions:No results for input(s): LACTA, AMYLASE in the last 72 hours. S. Lactic Acid: No results for input(s): LACTA in the last 72 hours. Cardiac enzymes:No results for input(s): CKTOTAL, CKMB, CKMBINDEX, TROPONINI in the last 72 hours. BNP:No results for input(s): BNP in the last 72 hours. Lipid profile: No results for input(s): CHOL, TRIG, HDL, LDLCALC in the last 72 hours.     Invalid input(s): LDL  Blood Gases: No results found for: PH, PCO2, PO2, HCO3, O2SAT  Thyroid functions:   Lab Results   Component Value Date    TSH 2.71 06/15/2018        Urinalysis:     Microbiology:    Cultures during this admission:     Blood cultures:                 [x] None drawn      [] Negative             []  Positive (Details:  )  Urine Culture:                   [x] None drawn      [] Negative             []  Positive (Details:  )  Sputum Culture:               [x] None drawn       [] Negative             []  Positive (Details:  )   Endotracheal aspirate:     [x] None drawn       [] Negative             []  Positive (Details:  )         -----------------------------------------------------------------  Radiological reports:    (See actual reports for details)      Recent Cardiac Catheterization summary:   (See actual reports for details)    Electrocardiogram:     Last Echocardiogram findings:   (See actual reports for details)       Assessment and Plan       Patient Active Problem List   Diagnosis    Low hemoglobin    Hypertension    Hyperlipidemia    Diabetes    Anxiety    Postmenopausal bleeding    Vaginal bleeding    Chronic fatigue    Urinary tract infection without hematuria    Elevated CA-125    Elevated CA 19-9 level    Pelvic mass    Pelvic abscess in female    Bandemia    Iron deficiency anemia due to chronic blood loss    Uterine neoplasm    Constipation    Iritis associated with diabetes mellitus ([facility] [address]    Moderate protein-calorie malnutrition [facility])             7/11/2018, 5:30 AM    Attending Physician Statement  I have discussed the care of [facility], including pertinent history and exam findings,  with the resident. I have seen and examined the patient and the key elements of all parts of the encounter have been performed by me. I agree with the assessment, plan and orders as documented by the resident with additions . Extensive discussion with patient's brothers and sisters along with Dr. Luci Hernandez and Steff Gomez RN taking care of patient. They have already discussed prognosis of malignancy with Dr. Roberto Roberts, GYN oncologist.  I explained to them course of overnight events. Showed them. CT of the abdomen and pelvis 1 month ago and CT abdomen and pelvis done July 9, 2018. It showed that mass has increased in size, but I do not know whether it has metastasized to distant organs. Overall, her prognosis is poor  Answered all their questions regarding comfort measures  Family later informed me that they wish to proceed with comfort measures. DNR CC form signed. Patient extubated  Hospice consult  Total critical care time caring for this patient with life threatening, unstable organ failure, including direct patient contact, management of life support systems, review of data including imaging and labs, discussions with other team members and physicians at least 28   Min so far today, excluding procedures. Treatment plan Discussed with nursing staff in detail , all questions answered . Electronically signed by Anuj Grover MD on   7/11/18 at 4:17 PM    Please note that this chart was generated using voice recognition Dragon dictation software. Although every effort was made to ensure the accuracy of this automated transcription, some errors in transcription may have occurred.

## 2018-07-11 NOTE — CONSULTS
Attestation signed by      Attending Physician Statement:    I have discussed the care of  [facility] , including pertinent history and exam findings, with the Cardiology fellow/resident. I have seen and examined the patient and the key elements of all parts of the encounter have been performed by me. I agree with the assessment, plan and orders as documented by the fellow/resident, after I modified exam findings and plan of treatments, and the final version is my approved version of the assessment. Additional Comments:     72 y/o female with large pelvic mass with hemorrhage had PEA/Asystole cardiac arrest earlier this morning and was successfully resuscitated. She is currently intubated and sedated. BP is stable on levophed infusion. In normal sinus rhythm. ECG shows old septal infarct and T wave changes in lateral leads. Serial troponin's are negative today. Hb is 6.6 and she is receiving transfusion. continue supportive management. Obtain TTE. She is not a candidate for any invasive cardiac work up due to ongoing bleeding. Family discussion regarding further goals of care this afternoon. Will continue to follow along. Thank you for allowing me to participate in the care of this patient, please do not hesitate to call with questions.      Catiaer mando [place], [address] [facility] [address], [address] [facility]  [phone]) [address] [facility]   Consultation Note               Today's Date: 7/11/2018  Patient Name: [facility]  Date of admission: 7/9/2018  2:52 PM  Patient's age: 71 y. o., 1945  Admission Dx: Leukocytosis [D72.829]  Elevated troponin [R74.8]  Troponin level elevated [R74.8]    Reason for Consult:  Cardiac arrest    Requesting Physician: Milady Chris MD    CHIEF COMPLAINT:  Abdominal pain and bleeding    History Obtained From:  electronic medical record, reason patient could not give history:  on ventilator    HISTORY OF PRESENT ILLNESS:      The patient is a 71 y.o. 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 She was admitted on 7/9/2018 for this reason. Last night, patient went into PEA cardiac arrest after calling out for help. She underwent successful resuscitation and had ROSC. Currently, she's intubated, but responsive. She is requiring pressors including Levophed. Palliative care is having a family meeting this after to discuss patient's critical condition. Of note, she has a history of hypertension and hyperlipidemia, but has had otherwise been in good health. Her EKG showed normal sinus rhythm with nonspecific ST-T changes. Past Medical History:   has a past medical history of Anxiety; Diabetes ([address] Hyperlipidemia; Hypertension; and Osteoarthritis. Past Surgical History:   has a past surgical history that includes eye surgery; partial hysterectomy (cervix not removed); Dilation and curettage of uterus (06/22/2018); sigmoidoscopy (06/22/2018); pr dilation/curettage,diagnostic (N/A, 6/22/2018); pr biopsy cervix, 1 or more, or excision of lesion (N/A, 6/22/2018); and proctosigmoidoscopy (N/A, 6/22/2018). Home Medications:    Prior to Admission medications    Medication Sig Start Date End Date Taking?  Authorizing Provider   polyethylene glycol Tyner Brick) packet Take 17 g by mouth daily 6/28/18 7/28/18 Yes Demetra Waite MD   prednisoLONE acetate (PRED FORTE) 1 % ophthalmic suspension Place 1 drop into the left eye 4 times daily 6/27/18  Yes Demetra Waite MD   docusate sodium (COLACE) 100 MG capsule Take 1 capsule by mouth 2 times daily as needed for Constipation 6/27/18  Yes Demetra Waite MD   ondansetron (ZOFRAN ODT) 4 MG disintegrating tablet Take 1 tablet by mouth every 8 hours as needed for Nausea or Vomiting 6/27/18  Yes

## 2018-07-11 NOTE — CARE COORDINATION
Spoke with Evangelista Padilla from [facility] at [phone]. [facility] per family at this time.

## 2018-07-11 NOTE — PROGRESS NOTES
Patient admitted on Mechanical Ventilator Protocol. Patients height measured at 63\" for an IBW 52.4kg    Patient placed on the ventilator on settings as charted on flowsheeet. Ventilator Bronchodilator assessment    Breath sounds: rhonchi  Inspiratory Pressure: 28  Plateau Pressure: 26    Patient assessed at level 1          []    Bronchodilator Assessment    BRONCHODILATOR ASSESSMENT SCORE  Score 0 (Home) 1 2 3 4   Breath Sounds   []  Chronic Ventilator: Patient at baseline [x]  Mild Wheezes/ Clear []  Intermittent wheezes with good air entry []  Bilateral/unilateral wheezing with diminished air entry []  Insp/Exp wheeze and/or poor aeration   Ventilator Pressures   []  Chronic Ventilator []  Insp. Pressure less than 25 cm H20 []  Insp. Pressure less than 25 cm H20 [x]  Insp. Pressure exceeds 25 cm H20 []  Insp.  Pressure exceeds 30 cm H20   Plateau Pressure []  NA   [x]  Plateau Pressure less than 4  []  Plateau Pressure less than or equal to 5 []  Plateau Pressure greater than or equal to 6 []  Plateau Pressure greater than or equal to [address]  5:45 AM

## 2018-07-11 NOTE — PATIENT CARE CONFERENCE
Palliative Care Family Conference    Patient: Mich Wick  Room: 0121/0121-01    Attended By: Dr. Lai White care attending, Dr. Mavis Holguin critical care team attending, patient's nurse Zak Vyas, and all of the patient's brothers and sisters    Reason for meeting:Routine meeting  Disease progression  Functional decline  Discuss goals of care  Treatment options/plans  Provide clinical updates and answer questions  Share information and provider education for the family  Listen to patient/family concerns  Assess family understanding, concerns, and coping  Poor prognosis is anticipated  Accomplish end of life planning  Provide emotional support to the family  Discuss prospect of foregoing or withdrawing life support measures  Other major care decisions  Code status and Hospice care     Conference Summary:    - The meeting was held in the conference room on the 1st floor in the  MICU    - The meeting was conducted by Dr. Mavis Holguin critical care team attending and me, attended by patient's nurse Zak Vyas and all of the patient's brothers ( 3 ) and sisters ( 11 )     - Dr. Mavis Holguin discussed patient's current medical conditions and also showed the current and old CT scans of the patient to the family    - Dr. Mavis Holguin informed the family that the patient's cancer was unoperatable and had grown much bigger and was involving other organs of the body and patient is not a candidate for chemotherapy due to her decreased health conditions    - I explained to the family regarding comfort care measures, terminal extubation and discussed regarding different types of code status     - I explained in details about the terminal extubation and hospice care for the patient    - I assured the family that the patient will have all comfort care medications once the patient was made comfort care    - The family told that they all do not want the patient to suffer    - [facility] [address] to be contacted for the patient once they make the decision    - Both Dr. Misti Yepez and myself told the family that they can discuss among themselves and if they have any further questions than those will be answered and also they can inform us about the further goals of care and if they wanted the code status to be changed to comfort care and hospice to be consulted    - We offered comfort and emotional support to the family    - We will follow-up with the family's plan of care      Conclusion/Plan:    - We will follow-up with the further plan of care with the family  - We will continue to provide comfort and support to the patient and family    The total time spent in family meeting discussing goals of care and code status and hospice care for the patient was 60 minutes   The note has been dictated by dragon, typing errors may be a possibility.          Electronically signed by   Melida Perez MD  Palliative Care Team  on 7/11/2018 at 1:43 PM

## 2018-07-11 NOTE — PROCEDURES
PROCEDURE NOTE - EMERGENCY INTUBATION    PATIENT NAME: [facility] RECORD NO. [id number]  DATE: 7/11/2018  ATTENDING PHYSICIAN: Dr. Xin Pollock DIAGNOSIS:  Acute Respiratory Failure in the setting of cardiac arrest  POSTOPERATIVE DIAGNOSIS:  Same  PROCEDURE PERFORMED:  Emergency endotracheal intubation  PERFORMING PHYSICIAN: Roxane Chavez DO    MEDICATIONS: etomidate intravenously and succinycholine intravenously    DISCUSSION:  Fany Newman is a 71y.o.-year-old female who requires intubation and ventilatory support due to Respiratory failure in the setting of cardiac arrest.  The history and physical examination were reviewed and confirmed. CONSENT: Unable to be obtained due to the emergent nature of this procedure. PROCEDURE:  A timeout was initiated by the bedside nurse and was confirmed by those present. The patient was placed in the appropriate position. Cricoid pressure was not required. Intubation was performed by indirect laryngoscopy using a glidescope and a 7.5 cuffed endotracheal tube. The cuff was then inflated and the tube was secured appropriately at a distance of 23 cm to the dental ridge. Initial confirmation of placement included bilateral breath sounds, an end tidal CO2 detector, absence of sounds over the stomach, tube fogging, adequate chest rise and adequate pulse oximetry reading. A chest x-ray to verify correct placement of the tube showed a borderline right mainstem intubation and the tube was repositioned appropriately. The patient tolerated the procedure well.      COMPLICATIONS:  None, single attempt     Roxane Chavez DO  5:55 AM, 7/11/18

## 2018-07-11 NOTE — PROGRESS NOTES
PRN Medications: potassium chloride **OR** potassium chloride **OR** potassium chloride, HYDROcodone 5 mg - acetaminophen, docusate sodium, ondansetron, sodium chloride flush, acetaminophen, morphine **OR** morphine, glucose, dextrose, glucagon (rDNA), dextrose  Continuous Infusions:   norepinephrine 15 mcg/min (07/11/18 1153)    EPINEPHrine infusion Stopped (07/11/18 1009)    midazolam 1 mg/hr (07/11/18 1009)    sodium chloride 150 mL/hr at 07/11/18 1042    dextrose                 Data:    I/O this shift:  In: 2501.7 [I.V.:2147; Blood:354.7]  Out: -   In: 2501.7 [I.V.:2147; Blood:354.7]  Out: -     CBC:   Recent Labs      07/09/18   1532   07/10/18   0549   07/10/18   1831  07/10/18   2241  07/11/18   0403   WBC  17.2*   --   17.2*   --    --    --   29.4*   HGB  7.8*   < >  8.7*   < >  8.9*  7.8*  7.5*   PLT  456*   --   373   --    --    --   315    < > = values in this interval not displayed. BMP:    Recent Labs      07/09/18   1532  07/10/18   0549  07/10/18   2241 07/11/18   0403  07/11/18   0441   NA  133*  131*   --   131*   --    K  3.5*  3.5*  4.2  5.7*   --    CL  96*  100   --   99   --    CO2  20  20   --   18*   --    BUN  9  9   --   11   --    CREATININE  0.44*  0.41*   --   0.63  0.67   GLUCOSE  167*  180*   --   136*   --      Hepatic:   Recent Labs      07/09/18   1532  07/10/18   0549   AST  18  16   ALT  10  9   BILITOT  0.85  0.49   ALKPHOS  168*  142*     INR:   Recent Labs      07/10/18   0549   INR  1.2     PTT:No results for input(s): PTT in the last 72 hours.         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 Mixed mullerian tumor   2. Anemia  3. Status post cardiac arrest    Plan     1. Medical management as per primary. 2. Continue to monitor Hgb , transfuse for Hgb <7  3. Radiation oncology consulted. 4. We will continue to follow   5. Gyn Onc On board  6. Thank you for allowing us to participate in the care of this patient. Jermaine Nava MD      Department of Internal Medicine  [facility]         7/11/2018, 12:04 PM    Attending Physician Statement  The patient was seen and examined during rounds, I have discussed the care of [facility], including pertinent history and exam findings with the resident. I have reviewed the key elements of all parts of the encounter with the resident. I agree with the assessment, and status of the problem list as documented.    Additional assessment/ plan       Electronically signed by   Alberto Rodrigues MD    on 7/11/2018 at 7:18 PM

## 2018-07-11 NOTE — PLAN OF CARE
Problem: Pain:  Goal: Pain level will decrease  Pain level will decrease   Outcome: Ongoing    Goal: Control of acute pain  Control of acute pain   Outcome: Ongoing    Goal: Control of chronic pain  Control of chronic pain   Outcome: Ongoing      Problem: Skin Integrity:  Goal: Will show no infection signs and symptoms  Will show no infection signs and symptoms   Outcome: Ongoing    Goal: Absence of new skin breakdown  Absence of new skin breakdown   Outcome: Ongoing      Problem: Falls - Risk of:  Goal: Will remain free from falls  Will remain free from falls    Outcome: Met This Shift    Goal: Absence of physical injury  Absence of physical injury    Outcome: Met This Shift

## 2018-07-11 NOTE — PLAN OF CARE
Katie Estrada 20    Second Visit Note  For more detailed information please refer to the progress note of the day      7/11/2018    5:13 PM    Name:   Raul Teresa  MRN:     [id number]     Ria:      [de-identified]   Room:   [facility]0121-01   Day:  2  Admit Date:  7/9/2018  2:52 PM    PCP:   Fahad Holbrook MD  Code Status:  DNR-CC        Pt vitals were reviewed   New labs were reviewed   Patient was seen    Updated plan :     1. Patient was extubated after family changed the code status to DNR CC  2. Hospice will meet the family tomorrow at 9:30 AM  3. Currently she is off the pressors and is able to talk  4. Discussed with family and patient at bedside  5.  Currently she is comfortable with comfort care measures        Olaf Mcdermott MD  7/11/2018  5:13 PM

## 2018-07-11 NOTE — PROCEDURES
PROCEDURE NOTE - CENTRAL VENOUS LINE PLACEMENT    PATIENT NAME: [facility] RECORD NO. [id number]  DATE: 7/11/2018  ATTENDING PHYSICIAN: Toñito Chowdhury MD    PREOPERATIVE DIAGNOSIS:  Need for IV access  POSTOPERATIVE DIAGNOSIS:  Same  PROCEDURE PERFORMED:  Right Femoral Vein Central Line Insertion  PERFORMING PHYSICIAN: Anna Peter MD  ANESTHESIA:  Cardiac arrest, etomidate for intubation  ESTIMATED BLOOD LOSS:  Less than 25 ml  COMPLICATIONS:  None immediately appreciated. DISCUSSION:  Rasta Ross is a 71y.o.-year-old female who required central IV access. The history and physical examination were reviewed and confirmed. THIS WAS A CRASH LINE so the diagnoses, proposed procedure, risks, possible complications, benefits and alternatives WERE NOT discussed with the patient or family. Implied consent was used. The patient was then prepared for the procedure. PROCEDURE:  The patient was placed in a supine position. The skin overlying the Right Femoral Vein was prepped with chlorhexadine. The vessel and surrounding anatomy was visualized using ultrasound. The introducer needle was inserted into the femoral vein returning dark red non pulsatile blood. A guidewire was placed through the center of the needle with no resistance. Ultrasound confirmed presence of wire in the vein. A small incision made in the skin with a #11 scalpel blade. The dilator was inserted into the skin and vein over guidewire using Seldinger technique. The dilator was then removed and the 7F 20cm catheter was placed in the vein over the guidewire using Seldinger technique. The guidewire was then removed and all ports aspirated and flushed appropriately. The catheter then secured using silk suture and a temporary sterile dressing was applied. No immediate complication was evident. All sponge, instrument and needle counts were correct at the completion of the procedure. Abdominal xray was ordered and is pending. The patient tolerated the procedure well with no immediate complication evident. Not all elements of maximal sterile barrier techniques were followed.      Melinda Quigley MD  5:28 AM, 7/11/18

## 2018-07-11 NOTE — PROGRESS NOTES
[address] [facility] [address]     Emergency/Trauma Note    PATIENT NAME: Pratik Rivas    Shift date:7/10/18  Shift day: Tuesday   Shift # 3    Room # 0121/0121-01   Name: Pratik Rivas            Age: 71 y.o. Gender: female          Orthodoxy: [address] [place] [address] of [employer]:     Trauma/Incident type:Rapid response,code blue  Admit Date & Time: 7/9/2018  2:52 PM  TRAUMA NAME:         PATIENT/EVENT DESCRIPTION:  Pratik Rivas is a 71 y.o. female who was in room 3004. Rapid response was called, then code blue. When I arrived, I was asked to call family members and ask them to come in.  talked to family members about patient's condition. Patient was moved to [facility]. I escorted family over to ICU waiting area. SPIRITUAL ASSESSMENT/INTERVENTION:  As family members arrived, I greeted them and had them wait outside patient room.  came out to family members and explained what happened. I talked to family members before going into room. I then escorted family members to MICU waiting area. Informed nurses that family was in the waiting room. Family members:  Chirag Zamarripa and Tyrese Fuchs     PATIENT BELONGINGS:  Given to [facility]    ANY BELONGINGS OF SIGNIFICANT VALUE NOTED:  Patient's wallet was given to family members. REGISTRATION STAFF NOTIFIED? }      WHAT IS YOUR SPIRITUAL CARE PLAN FOR THIS PATIENT?:  Flako Mendoza will follow up with family members. Change of Code status is to be discussed with family members this morning.     Electronically signed by Keenan Bates on 7/11/2018 at [address] [place] [address]   [phone]

## 2018-07-11 NOTE — CONSULTS
mouth daily 30 tablet 3    lisinopril (PRINIVIL;ZESTRIL) 40 MG tablet Take 1 tablet by mouth daily 30 tablet 3    metFORMIN (GLUCOPHAGE) 1000 MG tablet Take 1 tablet by mouth 2 times daily (with meals) 60 tablet 3    glipiZIDE (GLUCOTROL) 10 MG tablet Take 1 tablet by mouth 2 times daily (before meals) 60 tablet 3       Data         /65   Pulse 71   Temp 97.5 °F (36.4 °C) (Oral)   Resp 18   Ht 5' 3\" (1.6 m)   Wt 134 lb 14.7 oz (61.2 kg)   SpO2 98%   BMI 23.90 kg/m²     Wt Readings from Last 3 Encounters:   07/11/18 134 lb 14.7 oz (61.2 kg)   06/19/18 122 lb 3.2 oz (55.4 kg)   02/23/17 151 lb 12.8 oz (68.9 kg)        Code Status: Full Code     ADVANCED CARE PLANNING:  Patient has capacity for medical decisions: no  Health Care Power of : no  Living Will: no     Personal, Social, and Family History  Marital Status: single  Living situation: Not asked  Does patient understand diagnosis/treatment? no  Does caregiver understand diagnosis/treatment? yes      Assessment        REVIEW OF SYSTEMS    Unable to obtain due to intubation and sedation    PHYSICAL ASSESSMENT:  Constitutional:  Sedated And on Mech Vent. Head: Normocephalic and atraumatic. Eyes: EOM are normal. Pupils are equal, round   Neck: Normal range of motion. Neck supple. No tracheal deviation present. Cardiovascular: Normal rate and regular rhythm, S1, S2, no murmur   Pulmonary/Chest: Effort normal and breath sounds decreased. No rales or wheezes. Abdomen: Soft. No tenderness, + distended, no ascites, no organomegaly   Musculoskeletal: Normal range of motion. No edema lower ext. Neurological: Intubated and sedated, opens eyes to command   Skin: Normal turgor, no bleeding, no bruising     Palliative Performance Scale:  ___60%  Ambulation reduced; Significant disease; Can't do hobbies/housework; intake normal or reduced; occasional assist; LOC full/confusion  ___50%  Mainly sit/lie;  Extensive disease; Can't do any work; Considerable assist; intake normal or reduced; LOC full/confusion  ___40%  Mainly in bed; Extensive disease; Mainly assist; intake normal or reduced; LOC full/confusion   ___30%  Bed Bound; Extensive disease; Total care; intake reduced; LOCfull/confusion  ___20%  Bed Bound; Extensive disease; Total care; intake minimal; Drowsy/coma  __x_10%  Bed Bound; Extensive disease;  Total care; Mouth care only; Drowsy/coma  ___0       Death      Plan      Palliative Interaction:    - The patient was seen today and she remains intubated and sedated    - Patient's 2 sisters Terezamary Lucero and Nilesh were present in the patient's room    - Nilesh told me that patient is not  and does not have any children, the parents are  and she has 11 sisters and 3 brothers    - Nilesh informed me that the patient does not have POA paperwork for health    - The patient's current medical conditions were discussed with Nilesh and Shakira uLcero and they both stated that they understand that the patient has cancer which cannot be operated upon and the cancer has spread to her other body parts    - Both Nilesh and Shakira Lucero told that patient's other brothers and sisters all had met together and they all are of the opinion that the patient should not suffer    - I informed Nilesh and Shakira Lucero that a family meeting can be held and further goals of care for the patient can be made according to the majority of the patient's brothers and sisters decision    - Dez Lucero stated that all of the family can be here at 1 PM to discuss further goals of care    - I met with the critical care team attending Dr. Marie Tang and discussed patient's current medical conditions with him and also informed him regarding the one p.m. meeting today with the family for discussing code status and hospice care and he agreed with the plan    - I offered comfort and emotional support to both Nilesh and [employer] to staff  Education/support to family  Communications

## 2018-07-11 NOTE — CONSULTS
Gynecology [facility]    Patient Name: Kacy Swann     Patient : 1945  Room/Bed: 0121/0121-01  Admission Date/Time: 2018  2:52 PM  Primary Care Physician: Yari Meadows MD    Consulting Provider: Dr. Becka Baker    CC:   Chief Complaint   Patient presents with    Abdominal Pain     diffuse abdominal pain with constipation and decreased appetite. Pt recently recently diagnosed with cancer of the uterus and also has vaginal bleeding d/t the size of the tumor. Pt was sent here by her Oncologist                HPI: Kacy Swann is a 71 y.o. 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 Patient has received a total of 2 radiation cycles. Patient was admitted for further workup. The patient coded twice overnight and is now intubated and sedated. Dr. Wei Reynolds spoke with the family and told them the patient's prognosis as grim and that he would recommend the patient's code status be changed to DNR. Patient will discuss situation with the family and the medical ICU team.     REVIEW OF SYSTEMS:   Unable to obtain as paritent is intubated and sedated      OBSTETRICAL HISTORY:   Obstetric History       T0      L0     SAB0   TAB0   Ectopic0   Molar0   Multiple0   Live Births0           PAST MEDICAL HISTORY:   has a past medical history of Anxiety; Diabetes ([address] Hyperlipidemia; Hypertension; and Osteoarthritis. PAST SURGICAL HISTORY:   has a past surgical history that includes eye surgery; partial hysterectomy (cervix not removed);  Dilation and curettage of uterus (2018); sigmoidoscopy (2018); pr dilation/curettage,diagnostic (N/A, 2018); pr biopsy cervix, 1 or more, or excision of lesion (N/A, 2018); and proctosigmoidoscopy (N/A, 6/22/2018).     ALLERGIES:  Allergies as of 07/09/2018 - Review Complete 07/09/2018   Allergen Reaction Noted    Seasonal Other (See Comments) 06/22/2018    Januvia [sitagliptin] Anxiety 10/19/2016       MEDICATIONS:  Current Facility-Administered Medications   Medication Dose Route Frequency Provider Last Rate Last Dose    norepinephrine (LEVOPHED) 16 mg in dextrose 5 % 250 mL infusion  2 mcg/min Intravenous Continuous Jorge Garcia MD        sodium bicarbonate 8.4 % injection 50 mEq  50 mEq Intravenous Once Jorge Garcia MD        norepinephrine (LEVOPHED) infusion             naloxone (NARCAN) 0.4 MG/ML injection             EPINEPHrine (EPINEPHrine HCL) 5 mg in dextrose 5 % 250 mL infusion  1 mcg/min Intravenous Continuous Maria Alejandra Hoover DO 18 mL/hr at 07/11/18 0658 6 mcg/min at 07/11/18 0658    EPINEPHrine PF 1 MG/10ML injection 1 mg  1 mg Intravenous Once Clayton Small DO        midazolam (VERSED) 100 mg in dextrose 5% 100 mL infusion  1 mg/hr Intravenous Continuous Sam Aaron MD 2 mL/hr at 07/11/18 0530 2 mg/hr at 07/11/18 0530    0.9 % sodium chloride bolus  250 mL Intravenous Once Sam Aaron MD        potassium chloride (KLOR-CON M) extended release tablet 40 mEq  40 mEq Oral PRN Suly Foster MD        Or    potassium chloride 20 MEQ/15ML (10%) oral solution 40 mEq  40 mEq Oral PRN Suly Foster MD   40 mEq at 07/10/18 2145    Or    potassium chloride 10 mEq/100 mL IVPB (Peripheral Line)  10 mEq Intravenous PRN Suly Foster MD        HYDROcodone-acetaminophen [facility]) 5-325 MG per tablet 1 tablet  1 tablet Oral Q6H PRN Suly Foster MD   1 tablet at 07/10/18 2041    aspirin EC tablet 81 mg  81 mg Oral Daily Shavon Angelucci, APRN - CNS   81 mg at 07/10/18 1719    atorvastatin (LIPITOR) tablet 40 mg  40 mg Oral Daily Shavon Angelucci, APRN - CNS        calcium carbonate (TUMS) chewable tablet 500 mg  1 tablet Oral Daily Shavon Angelucci, APRN - CNS   500 mg at 07/10/18 1643 non-tender BLE and non-edematous  Psych:  oriented to time, place and person     OMM EXAM:  The patient did not complain of a Chief complaint requiring OMM. Chief Complaint:none    Structural Exam: No Interest    LAB RESULTS:  O POSITIVE       [unfilled]    No results found for: Cloria Charlotte, HCG, HCGQUANT    Lab Results   Component Value Date    WBC 29.4 (H) 07/11/2018    HGB 7.5 (L) 07/11/2018    HCT 24.9 (L) 07/11/2018    MCV 83.8 07/11/2018     07/11/2018       Lab Results   Component Value Date     07/11/2018    K 5.7 07/11/2018    CL 99 07/11/2018    CO2 18 07/11/2018    BUN 11 07/11/2018    CREATININE 0.67 07/11/2018    CREATININE 0.63 07/11/2018    GLUCOSE 136 07/11/2018    GLUCOSE 154 06/04/2012    CALCIUM 7.8 07/11/2018        DIAGNOSTICS:  Ct Abdomen Pelvis Wo Contrast Additional Contrast? None    Result Date: 7/9/2018  EXAMINATION: CT OF THE ABDOMEN AND PELVIS WITHOUT CONTRAST 7/9/2018 4:40 pm TECHNIQUE: CT of the abdomen and pelvis was performed without the administration of intravenous contrast. Multiplanar reformatted images are provided for review. Dose modulation, iterative reconstruction, and/or weight based adjustment of the mA/kV was utilized to reduce the radiation dose to as low as reasonably achievable. COMPARISON: CT abdomen and pelvis June 17, 2018. HISTORY: ORDERING SYSTEM PROVIDED HISTORY: Possible obstruction TECHNOLOGIST PROVIDED HISTORY: Additional Contrast?->None FINDINGS: Lower Chest: Small bilateral pleural effusions, similar the prior study. Left basilar atelectasis/scarring. Organs: Suboptimal evaluation due to non use of IV contrast.  Liver, spleen, gallbladder, pancreas and adrenal glands all appear unremarkable. Kidneys demonstrate no evidence of stone or hydronephrosis. Abdominal aorta appears normal in caliber with mild calcification. GI/Bowel: There appears to be thickening at the GE junction. Distal stomach appears unremarkable.   Small bowel appears nondilated. No acute colonic abnormality is seen. Descending colon diverticulosis. Pelvis: Once again demonstrated is the patient's large pelvic mass which appears to have increased in size with interval worsening of the fluid component with a air-fluid level present. Given the lack of IV contrast, fistula cannot be totally excluded, however no air bubbles are seen within the urinary bladder. This mass appears to have a central area of calcification as seen on the prior study. The mass now measures 17.9 x 10.5 x 21.0 cm and appears to have the configuration of the uterus. However, per the patient's history, uterus has been removed. Ovaries are not clearly identified. Peritoneum/Retroperitoneum: No free air. No free fluid. No lymphadenopathy. Bones/Soft Tissues: Abdominal wall demonstrates diffuse body wall anasarca. No fluid collection is seen. Osseous structures demonstrate degenerative changes. No aggressive bony lesions are clearly identified. *No evidence of small bowel obstruction. *Interval enlargement of the patient's pelvic mass now measuring 17.9 x 10.5 x 21.0 cm with interval progression of the fluid component with an air-fluid level seen. Findings are consistent the patient's known malignancy. Superimposed infectious process or possibly Fistula development cannot be totally excluded. *Small bilateral pleural effusions. Xr Chest Standard (2 Vw)    Result Date: 7/9/2018  EXAMINATION: TWO VIEWS OF THE CHEST 7/9/2018 4:06 pm COMPARISON: Chest Monica 15, 2018. HISTORY: ORDERING SYSTEM PROVIDED HISTORY: SOB TECHNOLOGIST PROVIDED HISTORY: Reason for exam:->SOB FINDINGS: Heart appears normal in size. Left lower lobe airspace disease is seen. Right lower lobe atelectasis. No pneumothorax, pleural effusion or free air. *Left lower lobe airspace disease. Right lower lobe atelectasis. Repeat chest x-ray after therapy is recommended to confirm response of therapy and resolution.      Olena Check This may represent large calcified leiomyoma with areas of degeneration. Further inferiorly there is fluid, gas and irregular nodular soft tissue density enlarging the cervix. Findings in the cervical region are concerning for neoplasm. Peritoneum/Retroperitoneum: No significant lymphadenopathy. Trace amount of free fluid in the pelvis. Bones/Soft Tissues: Degenerative changes. No acute abnormality of the bones. The superficial soft tissues show no significant abnormalities. 1. There are three sub 5 mm nodules, one in the right upper lobe, lingula and the other lower lobe. Indeterminate. 2. A flat 4 mm nodule along the minor fissure is likely benign. 3. Trace bilateral pleural effusions. 4. A 7.5 cm rounded mass with calcifications, soft tissue and gas pockets located centrally in presumed uterus may represent calcified fibroid with degeneration less likely endometrial mass. See discussion above. 5. Heterogeneous enhancement and enlargement of presumed cervical region worrisome for neoplasm. RECOMMENDATIONS: Gynecologic consultation. Xr Chest Portable    Tip of the endotracheal tube is at entrance of the mainstem bronchus. Consider retraction of about 4.8 cm, and repeat chest radiograph. Tip of the nasogastric tube projects over the gastroesophageal junction. Recommend advancement. Findings suggestive of worsening pulmonary edema. Small bilateral pleural effusions. Bibasilar pulmonary opacities may represent atelectasis, pneumonia, and/or aspiration. Recommend radiographic follow-up to complete resolution. Dilated bowel loop within the left hemiabdomen is partially imaged. The findings were sent to the Radiology Results [address] at 6:03 am on 7/11/2018to be communicated to a licensed caregiver.      Pet Ct Skull Base To Mid Thigh    Result Date: 7/6/2018  EXAMINATION: WHOLE BODY PET/CT 7/6/2018 TECHNIQUE: Following IV injection of 13.38 mCi of F 18 -FDG, PET  tumor imaging was acquired from the base of the skull to the mid thighs. Computed tomography was used for purposes of attenuation correction and anatomic localization. Fusion imaging was utilized for interpretation. Uptake time 68 mins. Glucose level 111 mg/dl. COMPARISON: None HISTORY: ORDERING SYSTEM PROVIDED HISTORY: Malignant neoplasm of overlapping sites of female genital organs (HCC) Central pelvic mass. Diabetic patient. FINDINGS: HEAD/NECK: Normal physiologic uptake of FDG is noted in the brain parenchyma and salivary glands. No abnormal hypermetabolic FDG avid lesions are present in the cervical lymph node chain or supraclavicular areas. CHEST: Normal physiologic uptake of FDG is noted in the great vessels and left ventricular myocardium. No abnormal FDG avid lesions are noted in the mediastinum, hilar areas or lung fields. The patient has bilateral pleural effusions, small, with SUV maximum of 1.4. ABDOMEN/PELVIS: Normal physiologic uptake of FDG is noted within the liver, spleen, and urinary collecting systems to include the kidneys, ureters and bladder. No abnormal FDG avidity within the bowel is noted. The patient has a large midline pelvic mass containing calcifications and an air-fluid level. This mass demonstrates no central abnormal FDG avidity although FDG uptake in the wall of the mass is mild with SUV maximum of 5.2, suspicious for neoplasia with central necrosis. The area of central calcification in the air-fluid level is 9.9 x 13.4 cm. No adnexal hypermetabolic activity is noted. Findings are likely related to uterine neoplasia with possible necrotic fibroids. Possibility of atypical uterine infectious process is not excluded. In the presacral space a mass is present likely stool-filled colon with SUV maximum of 2.6. No abnormal FDG avid lesions are noted within the groin.  BONES/SOFT TISSUE: Diffuse increased FDG uptake is noted throughout the bone marrow including the pelvis, lumbar spine, included thoracic spine, and humeri. If the patient has received chemotherapy treatment, this may represent marrow stimulation from treatment. Otherwise, marrow disorder is not excluded. INCIDENTAL CT FINDINGS: Bilateral pleural effusions are noted, small, left greater than right. Small hiatal hernia is evident. Large central pelvic mass containing floccular calcifications and an air-fluid level is noted. Estimated biologic radiation dose for this procedure:192.55 mGy/cm2.     1.  Large mass in the central pelvis, likely abnormal uterus obtaining large central area with air-fluid levels and floccular calcifications. Periphery of the lesion is hypermetabolic. Possibilities would include uterine/endometrial carcinoma with necrosis with abscessed uterine fibroid/pyomyoma not excluded. 2.   Diffusely increased hypermetabolic activity within the skeleton which may be due to marrow stimulation or marrow disorder. Case discussed with the ordering clinician at 1511 hours, 2018. RECOMMENDATIONS: Whole body radionuclide bone imaging is advised. ASSESSMENT & PLAN:    Scout Ordonez is a 71 y.o. 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 Patient is now intubated and sedated.    -Dr. Carroll Sweeney discussed options regarding the code status with the patient's family, and recommendations of possible DNR.  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    Leukocytosis 2018    Elevated troponin 2018    Troponin level elevated 2018    Iritis associated with diabetes mellitus ([facility] [address] 2018    Moderate protein-calorie malnutrition (HCC)     Hypokalemia     Constipation     Iron deficiency anemia due to chronic blood loss     Uterine neoplasm     Pelvic abscess in female     Bandemia     Low hemoglobin 06/16/2018    Postmenopausal bleeding 06/16/2018    Vaginal bleeding 06/16/2018    Chronic fatigue 06/16/2018    Urinary tract infection without hematuria 06/16/2018    Elevated CA-125 06/16/2018    Elevated CA 19-9 level 06/16/2018    Pelvic mass 06/16/2018    Hypertension     Hyperlipidemia     Diabetes     Anxiety        Plan discussed with Dr. Gildardo Ann, who is agreeable.      Attending's Name: Dr. Ruba Larios DO  Ob/Gyn Resident  Pager: [phone]  7/11/2018, 7:44 AM

## 2018-07-12 NOTE — PROGRESS NOTES
Katie Estrada 20    Progress Note    7/12/2018    2:32 PM    Name:   Scout Ordonez  MRN:     [id number]     Melissalyside:      [de-identified]   Room:   [address] [place] [address] Day:  0  Admit Date:  7/12/2018  1:27 PM    PCP:   Juan Khan MD  Code Status:  DNR-CC    Subjective:     C/C:   No chief complaint on file. Interval History Status:    pt was extubated yesterday evening  Has been doing well since then  She has been off pressors  Hospice  team will be seeing her today at 9:30 AM since her code status is DNR CC now  Denies any pain  Family is at bedside waiting for hospice team      Brief History:   The patient is a 71 y.o.  female who is admitted to the hospital for for abdominal pain. She has a recently diagnosed mixed mullerian tumor. Daryl Castle continues to have vaginal bleeding. Rafael Slider states that she underwent 2 cycles of radiation on Thursday and Friday and was supposed to undergo third cycle when she was told that her lesion had increased and was asked to get admitted to the hospital. She has a H/O partial hysterectomy. As per Dr Kinjal Montez note, patient was not considered a candidate for surgery and the plan was to start radiation followed by systemic chemotherapy and then to consider surgery. Her hemoglobin was 6.8 on presentation and she received one blood transfusion.  Denies any nausea, vomiting or hematemesis. Rafael Slider states that she could have blood in stools but she cannot tell because of the continued vaginal bleeding as well.   CT abdomen done yesterday shows enlargement of the patient's pelvic mass which measures 17.9 x 10.5 x 21 cm with interval progression of the fluid component with an air-fluid level.     Patient states daily yesterday she was constipated but she had a bowel movement today morning which is moderate amount    Gynecology oncology consult was requested by heme/onc    Patient had code blue called at 3:40 AM  On 7/11 18 after she complained of not feeling well and had foaming from the mouth associated with desaturation  CPR was done for cardiac arrest and she was intubated and transferred to MICU    Review of Systems:     Denies any pain  Denies nausea vomiting  Denies shortness of breath  Medications: Allergies: Allergies   Allergen Reactions    Seasonal Other (See Comments)     Allergic rhinitis    Januvia [Sitagliptin] Anxiety       Current Meds:   Scheduled Meds:    sodium chloride flush  10 mL Intravenous 2 times per day    senna  2 tablet Oral Nightly     Continuous Infusions:     PRN Meds: acetaminophen, sodium chloride flush, Glycopyrrolate, LORazepam, morphine, ondansetron    Data:     Past Medical History:   has a past medical history of Anxiety; Diabetes ([facility] [address] Hyperlipidemia; Hypertension; Osteoarthritis; and Pelvic cancer ([facility] [address] Social History:   reports that she has never smoked. She does not have any smokeless tobacco history on file. She reports that she does not drink alcohol or use drugs. Family History:   Family History   Problem Relation Age of Onset    Heart Disease Mother     Cancer Father        Vitals: There were no vitals taken for this visit. Temp (24hrs), Av.4 °F (36.9 °C), Min:97.9 °F (36.6 °C), Max:98.8 °F (37.1 °C)    Recent Labs      18   0326  18   04418   0905  18   1342   POCGLU  141*  528*  199*  143*       I/O (24Hr): No intake or output data in the 24 hours ending 18 1432    Labs:    Hematology:  Recent Labs      07/10/18   0549   07/10/18   2241  07/11/18   0403  18   1221   WBC  17.2*   --    --   29.4*  22.9*   HGB  8.7*   < >  7.8*  7.5*  9.9*   HCT  29.4*   --    --   24.9*  31.3*   PLT  373   --    --   315  361   INR  1.2   --    --    --    --     < > = values in this interval not displayed.      Chemistry:  Recent Labs      07/10/18   0549  07/10/18   2241  18   0403  18   04418   1221   NA  131*   --   131* --   135   K  3.5*  4.2  5.7*   --   4.3   CL  100   --   99   --   105   CO2  20   --   18*   --   20   GLUCOSE  180*   --   136*   --   179*   BUN  9   --   11   --   15   CREATININE  0.41*   --   0.63  0.67  0.74   MG  2.0   --   2.0   --    --    ANIONGAP  11   --   14   --   10   LABGLOM  >60   --   >60  >60  >60   GFRAA  >60   --   >60   --   >60   CALCIUM  7.7*   --   7.8*   --   8.0*   PHOS   --    --   3.8   --    --      Recent Labs      07/09/18   1532  07/10/18   0549   PROT  6.7  6.1*   LABALBU  2.0*  1.6*   AST  18  16   ALT  10  9   ALKPHOS  168*  142*   BILITOT  0.85  0.49         Lab Results   Component Value Date/Time    SPECIAL NOT REPORTED 06/23/2018 10:20 AM    SPECIAL NOT REPORTED 06/23/2018 10:20 AM     Lab Results   Component Value Date/Time    CULTURE (A) 06/23/2018 10:20 AM     BACTEROIDES DISTASONIS BETA LACTAMASE NEGATIVE SCANT GROWTH    CULTURE (A) 06/23/2018 10:20 AM     PREVOTELLA (BACTEROIDES) MELANINOGENICA BETA LACTAMASE POSITIVE SCANT GROWTH    CULTURE MICROMONAS MICROS SCANT GROWTH (A) 06/23/2018 10:20 AM    CULTURE NO AEROBIC ORGANISMS ISOLATED (A) 06/23/2018 10:20 AM       Lab Results   Component Value Date    POCPH 7.299 07/11/2018    POCPCO2 50.0 07/11/2018    POCPO2 123.4 07/11/2018    POCHCO3 24.6 07/11/2018    NBEA 2 07/11/2018    PBEA NOT REPORTED 07/11/2018    PJI9QQT 26 07/11/2018    XFOA0BLJ 98 07/11/2018    FIO2 100.0 07/11/2018       Radiology:  CXR  *Left lower lobe airspace disease.  Right lower lobe atelectasis.  Repeat   chest x-ray after therapy is recommended to confirm response of therapy and   resolution.          CT abdomen  *No evidence of small bowel obstruction. *Interval enlargement of the patient's pelvic mass now measuring 17.9 x 10.5   x 21.0 cm with interval progression of the fluid component with an air-fluid   level seen.  Findings are consistent the patient's known malignancy.    Superimposed infectious process or possibly Fistula development

## 2018-07-12 NOTE — PROGRESS NOTES
Subclavian [] Left Subclavian       DE JESUS'S CATHETER:   [x] No   [] Yes  (Date of Insertion:   )     URINE OUTPUT:            [x] Good   [] Low              [] Anuric      OBJECTIVE:     VITAL SIGNS:  /67   Pulse 112   Temp 98.8 °F (37.1 °C) (Oral)   Resp 22   Ht 5' 3\" (1.6 m)   Wt 134 lb 14.7 oz (61.2 kg)   SpO2 97%   BMI 23.90 kg/m²   Tmax over 24 hours:  Temp (24hrs), Av.4 °F (36.9 °C), Min:97.9 °F (36.6 °C), Max:98.8 °F (37.1 °C)      Patient Vitals for the past 6 hrs:   BP Temp Temp src Pulse Resp SpO2   18 0718 138/67 98.8 °F (37.1 °C) Oral 112 22 97 %         Intake/Output Summary (Last 24 hours) at 18 0816  Last data filed at 18 0729   Gross per 24 hour   Intake             3172 ml   Output                0 ml   Net             3172 ml     Wt Readings from Last 2 Encounters:   18 134 lb 14.7 oz (61.2 kg)   18 122 lb 3.2 oz (55.4 kg)     Body mass index is 23.9 kg/m². PHYSICAL EXAMINATION:  Constitutional: Appears well, no distress  EENT: PERRLA, EOMI, sclera clear, anicteric, oropharynx clear, no lesions, neck supple with midline trachea. Neck: Supple, symmetrical, trachea midline, no adenopathy,  no jvd, skin normal  Respiratory: clear to auscultation, no wheezes or rales and unlabored breathing.  No intercostal tenderness  Cardiovascular: regular rate and rhythm, normal S1, S2, no murmur noted  Abdomen: soft, nontender, nondistended, no masses or organomegaly  Extremities:   no pedal edema, no clubbing or cyanosis         MEDICATIONS:    Scheduled Meds:   sodium chloride flush  10 mL Intravenous 2 times per day     Continuous Infusions:   sodium chloride 10 mL/hr at 18 1445    dextrose       PRN Meds:     fentanNYL 25 mcg Q30 Min PRN   Or     fentanNYL 50 mcg Q30 Min PRN   Or     fentanNYL 75 mcg Q30 Min PRN   LORazepam 1 mg Q30 Min PRN   Or     LORazepam 2 mg Q30 Min PRN   Or     LORazepam 3 mg Q30 Min PRN   Glycopyrrolate 0.1 mg Q6H PRN Magnesium:   Lab Results   Component Value Date    MG 2.0 07/11/2018     Phosphorus:   Lab Results   Component Value Date    PHOS 3.8 07/11/2018     Ionized Calcium: No results found for: MAURICIOON     Urinalysis:     Troponin: No results for input(s): TROPONINI in the last 72 hours.     Microbiology:    Cultures during this admission:     Blood cultures:                 [x] None drawn      [] Negative             []  Positive (Details:  )  Urine Culture:                   [x] None drawn      [] Negative             []  Positive (Details:  )  Sputum Culture:               [x] None drawn       [] Negative             []  Positive (Details:  )   Endotracheal aspirate:     [x] None drawn       [] Negative             []  Positive (Details:  )     Other pertinent Labs:       Radiology/Imaging:     Chest Xray (7/12/2018): no new imaging    ASSESSMENT:     Patient Active Problem List    Diagnosis Date Noted    Encounter for palliative care     Abdominal mass     Lower abdominal pain 07/10/2018    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    Leukocytosis 07/09/2018    Elevated troponin 07/09/2018    Troponin level elevated 07/09/2018    Iritis associated with diabetes mellitus ([address] 06/26/2018    Moderate protein-calorie malnutrition ([address]     Hypokalemia     Constipation     Iron deficiency anemia due to chronic blood loss     Uterine neoplasm     Pelvic abscess in female     Bandemia     Low hemoglobin 06/16/2018    Postmenopausal bleeding 06/16/2018    Vaginal bleeding 06/16/2018    Chronic fatigue 06/16/2018    Urinary tract infection without hematuria 06/16/2018    Elevated CA-125 06/16/2018    Elevated CA 19-9 level 06/16/2018    Pelvic mass 06/16/2018    Hypertension     Hyperlipidemia     Diabetes     Anxiety        PLAN:     WEAN PER PROTOCOL:  [] No   [] Yes  [x] N/A    DISCONTINUE ANY LABS:   [] No   [x] Yes    ICU PROPHYLAXIS:  Stress ulcer:  [] PPI Agent  [] [id number] [] Sucralfate  [x] Other:

## 2018-07-12 NOTE — PROGRESS NOTES
CC.  6. Thank you for allowing us to participate in the care of this patient.        Louis Prater MD      Department of Internal Medicine  [facility] [facility]         7/12/2018, 11:36 AM

## 2018-07-12 NOTE — DISCHARGE SUMMARY
all medical intervention was discontinued per family's wishes, aside from comfort care. Hospice met with the patient and her family today and they have elected to pursue inpatient hospice. Patient discharged to inpatient hospice.     Consults:   Cardiology, hematology/oncology, gyn onc, palliative care    Procedures:  Code blue due to PEA arrest; intubation & extubation    Any Hospital Acquired Infections: n/a    PATIENT'S DISCHARGE CONDITION:      PATIENT/FAMILY INSTRUCTIONS:   Discharge Medication List as of 7/12/2018 12:41 PM      STOP taking these medications       polyethylene glycol (GLYCOLAX) packet Comments:   Reason for Stopping:         prednisoLONE acetate (PRED FORTE) 1 % ophthalmic suspension Comments:   Reason for Stopping:         docusate sodium (COLACE) 100 MG capsule Comments:   Reason for Stopping:         ondansetron (ZOFRAN ODT) 4 MG disintegrating tablet Comments:   Reason for Stopping:         calcium carbonate (ANTACID) 500 MG chewable tablet Comments:   Reason for Stopping:         atorvastatin (LIPITOR) 40 MG tablet Comments:   Reason for Stopping:         verapamil (CALAN SR) 240 MG extended release tablet Comments:   Reason for Stopping:         aspirin EC 81 MG EC tablet Comments:   Reason for Stopping:         labetalol (NORMODYNE) 300 MG tablet Comments:   Reason for Stopping:         hydrochlorothiazide (HYDRODIURIL) 25 MG tablet Comments:   Reason for Stopping:         lisinopril (PRINIVIL;ZESTRIL) 40 MG tablet Comments:   Reason for Stopping:         metFORMIN (GLUCOPHAGE) 1000 MG tablet Comments:   Reason for Stopping:         glipiZIDE (GLUCOTROL) 10 MG tablet Comments:   Reason for Stopping:             Activity: activity as tolerated    Diet: regular diet    Disposition: inpatient hospice    Electronically signed by Rhona Waldrop MD on 7/12/2018 at 3:11 PM

## 2018-07-12 NOTE — PLAN OF CARE
Problem: Pain:  Goal: Control of acute pain  Control of acute pain   Outcome: Ongoing      Problem: Skin Integrity:  Goal: Will show no infection signs and symptoms  Will show no infection signs and symptoms   Outcome: Ongoing      Problem: Falls - Risk of:  Goal: Will remain free from falls  Will remain free from falls    Outcome: Met This Shift

## 2018-07-12 NOTE — H&P
Medications Prior to Admission:     Prior to Admission medications    Not on File        Allergies:     Seasonal and Januvia [sitagliptin]    Social History:     Tobacco:    reports that she has never smoked. She does not have any smokeless tobacco history on file. Alcohol:      reports that she does not drink alcohol. Drug Use:  reports that she does not use drugs. Family History:     Family History   Problem Relation Age of Onset    Heart Disease Mother     Cancer Father        Review of Systems:     Positive and Negative as described in HPI. CONSTITUTIONAL:  negative for fevers, chills, sweats, fatigue, weight loss  HEENT:  negative for vision, hearing changes, runny nose, throat pain  RESPIRATORY:  negative for shortness of breath, cough, congestion, wheezing. CARDIOVASCULAR:  negative for chest pain, palpitations  GASTROINTESTINAL:  negative for nausea, vomiting, diarrhea, constipation, change in bowel habits, abdominal pain   GENITOURINARY:  negative for difficulty of urination, burning with urination, frequency   INTEGUMENT:  negative for rash, skin lesions, easy bruising   HEMATOLOGIC/LYMPHATIC:  negative for swelling/edema   ALLERGIC/IMMUNOLOGIC:  negative for urticaria , itching  ENDOCRINE:  negative increase in drinking, increase in urination, hot or cold intolerance  MUSCULOSKELETAL:  negative joint pains, muscle aches, swelling of joints  NEUROLOGICAL:  negative for headaches, dizziness, lightheadedness, numbness, pain, tingling extremities  BEHAVIOR/PSYCH:  negative for depression, anxiety    Physical Exam:   There were no vitals taken for this visit.   Temp (24hrs), Av.4 °F (36.9 °C), Min:97.9 °F (36.6 °C), Max:98.8 °F (37.1 °C)    Recent Labs      18   0326  18   0441  18   0905  18   1342   POCGLU  141*  528*  199*  143*     No intake or output data in the 24 hours ending 18 1535    General Appearance:  alert, well appearing, and in no acute

## 2018-07-12 NOTE — PLAN OF CARE
Problem: Grieving:  Goal: Verbalizes feelings, concerns and thoughts  Verbalizes feelings, concerns and thoughts  Outcome: Ongoing      Problem: Pain:  Goal: Pain level will decrease  Pain level will decrease  Outcome: Met This Shift    Goal: Recognizes and communicates pain  Recognizes and communicates pain  Outcome: Met This Shift

## 2018-07-12 NOTE — PROGRESS NOTES
medications with her    - Xiomara No had informed me that there was no bed available in the hospice [facility] and the patient will be converted into hospice bed here in the hospital until the bed becomes available    - I assured the family that all comfort care medications will be placed for the patient    - I offered comfort and emotional support to the patient and family    - I met with critical care team attending Dr. 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 Medication(s) being used: acetaminophen, narcotic analgesics including fentanyl IV, morphine. Anxiety:  fatigue                          Dyspnea:  none                          Fatigue:  none and Tiredness    We feel the patient symptoms are being controlled. her current regimen is reviewed by myself and discussed with the staff.    We recommend adjusting her medications as follows -    - Fentanyl IV 25 mcg every 30 minutes when necessary for mild pain  - Fentanyl IV 50 MCG every 30 minutes when necessary for moderate pain  - Fentanyl IV 75 MCG every

## 2018-07-12 NOTE — PROGRESS NOTES
Progress Note    Date: 2018  Time: 8:49 AM    Nikki Osler is a 71 y.o. female  HD# 2    Patient was seen and examined. She is drowsy but awake. Pain is  controlled. She denies any vaginal bleeding. She is not ambulating without difficulty. She is not passing flatus. She denies Fever/Chills, Chest Pain, SOB, N/V, Calf Pain    Vitals:  Vitals:    18 1600 18 1700 18 0718   BP:   (!) 145/69 138/67   Pulse: 86 89 102 112   Resp:  24 22   Temp:   97.9 °F (36.6 °C) 98.8 °F (37.1 °C)   TempSrc:   Oral Oral   SpO2: (!) 75% 91% 90% 97%   Weight:       Height:             Intake/Output:   Last Shift: I/O last 3 completed shifts: In: 3419.7 [I.V.:3065; Blood:354.7]  Out: -   Current Shift: I/O this shift:  In: 107 [I.V.:107]  Out: -     PHYSICAL EXAM:     General Appearance: No acute distress,  Alert & cooperative; ill-appearing & fatigued  Neck and EENT: normal atraumatic, no neck masses  Respiratory: Normal expansion. Clear to auscultation. No rales, rhonchi, or wheezing. Cardiovascular: normal, regular rate and rhythm  Abdomen: soft, non-tender, non-distended, no right upper quadrant tenderness and no CVA tenderness  Pelvic Exam: not indicated  Rectal Exam: exam declined by patient  Musculoskeletal: no gross abnormalities  Extremities: non-tender BLE and non-edematous  Psych:  oriented to time, place and person        Lab:  Complete Blood Count:   Recent Labs      07/10/18   0549   07/10/18   2241  18   0403  18   1221   WBC  17.2*   --    --   29.4*  22.9*   HGB  8.7*   < >  7.8*  7.5*  9.9*   HCT  29.4*   --    --   24.9*  31.3*   PLT  373   --    --   315  361    < > = values in this interval not displayed.         PT/INR:    Lab Results   Component Value Date    PROTIME 12.3 07/10/2018    INR 1.2 07/10/2018     PTT:    Lab Results   Component Value Date    APTT 19.8 2018       Comprehensive Metabolic Profile:   Recent Labs      07/10/18   0549 07/10/18   2241  18   0403  18   0441  18   1221   NA  131*   --   131*   --   135   K  3.5*  4.2  5.7*   --   4.3   CL  100   --   99   --   105   CO2  20   --   18*   --   20   BUN  9   --   11   --   15   CREATININE  0.41*   --   0.63  0.67  0.74   GLUCOSE  180*   --   136*   --   179*   CALCIUM  7.7*   --   7.8*   --   8.0*   PROT  6.1*   --    --    --    --    LABALBU  1.6*   --    --    --    --    BILITOT  0.49   --    --    --    --    ALKPHOS  142*   --    --    --    --    AST  16   --    --    --    --    ALT  9   --    --    --    --         Assessment/Plan:  Ankur Chong 71 y.o. 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    Leukocytosis 2018    Elevated troponin 2018    Troponin level elevated 2018    Iritis associated with diabetes mellitus ([facility] [address] 2018    Moderate protein-calorie malnutrition (H[facility])     Hypokalemia     Constipation     Iron deficiency anemia due to chronic blood loss     Uterine neoplasm     Pelvic abscess in female     Bandemia     Low hemoglobin 2018    Postmenopausal bleeding 2018    Vaginal bleeding 2018    Chronic fatigue 2018    Urinary tract infection without hematuria 2018    Elevated CA-125 2018    Elevated CA 19-9 level 2018    Pelvic mass 2018    Hypertension     Hyperlipidemia     Diabetes     Anxiety Mariano Clay DO  Ob/Gyn Resident  Pager: [phone]  7/12/2018, 8:49 AM

## 2018-07-12 NOTE — PROGRESS NOTES
surgery. Her hemoglobin was 6.8 on presentation and she received one blood transfusion.  Denies any nausea, vomiting or hematemesis. Zenaida Oneill states that she could have blood in stools but she cannot tell because of the continued vaginal bleeding as well. CT abdomen done yesterday shows enlargement of the patient's pelvic mass which measures 17.9 x 10.5 x 21 cm with interval progression of the fluid component with an air-fluid level.     Patient states daily yesterday she was constipated but she had a bowel movement today morning which is moderate amount    Gynecology oncology consult was requested by heme/onc      Review of Systems:     Unable to obtain  Patient is intubated and sedated  Medications: Allergies: Allergies   Allergen Reactions    Seasonal Other (See Comments)     Allergic rhinitis    Januvia [Sitagliptin] Anxiety       Current Meds:   Scheduled Meds:    sodium chloride flush  10 mL Intravenous 2 times per day     Continuous Infusions:    sodium chloride 10 mL/hr at 18 1445    dextrose       PRN Meds: fentanNYL **OR** fentanNYL **OR** fentanNYL, LORazepam **OR** LORazepam **OR** LORazepam, Glycopyrrolate, ondansetron, sodium chloride flush, acetaminophen, dextrose    Data:     Past Medical History:   has a past medical history of Anxiety; Diabetes ([address] Hyperlipidemia; Hypertension; and Osteoarthritis. Social History:   reports that she has never smoked. She does not have any smokeless tobacco history on file. She reports that she does not drink alcohol or use drugs.      Family History:   Family History   Problem Relation Age of Onset    Heart Disease Mother     Cancer Father        Vitals:  /67   Pulse 112   Temp 98.8 °F (37.1 °C) (Oral)   Resp 22   Ht 5' 3\" (1.6 m)   Wt 134 lb 14.7 oz (61.2 kg)   SpO2 97%   BMI 23.90 kg/m²   Temp (24hrs), Av.1 °F (36.7 °C), Min:97.5 °F (36.4 °C), Max:98.8 °F (37.1 °C)    Recent Labs      18   0326  18   8531 care [Z51.5]     Abdominal mass [R19.00]     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       Plan:        1. Hospice meeting today  2.  Discharge plan after hospice meeting    Petr Chaves MD  7/12/2018  7:48 AM

## 2018-07-12 NOTE — DISCHARGE SUMMARY
air-fluid level.     Patient states daily yesterday she was constipated but she had a bowel movement today morning which is moderate amount     Gynecology oncology consult was requested by heme/onc    Earlier yesterday:  Patient had code blue called at 3:40 AM after she complained of not feeling well and had foaming from the mouth associated with desaturation  CPR was done for cardiac arrest and she was intubated and transferred to MICU    Interval History Status:      1. Patient was extubated after family changed the code status to DNR CC  2. Hospice will meet the family Today at 9:30 AM  3. Currently she is off the pressors and is able to talk  4. Discussed with family and patient at bedside  5. Currently she is comfortable with comfort care measures  6.     Primary Problem  Acute hypoxemic respiratory failure following cardiac arrest  Shock due to above  Lower abdominal pain  Uterine neoplasm-Mixed mullerian tumor         Active Hospital Problems     Diagnosis Date Noted    Lower abdominal pain [R10.30] 07/10/2018       Priority: High    Leukocytosis [D72.829] 07/09/2018       Priority: High    Uterine neoplasm [D49.59]         Priority: High    Low hemoglobin [D64.9] 06/16/2018       Priority: High    Constipation [K59.00]         Priority: Medium    Anxiety [F41.9]         Priority: Medium    Moderate protein-calorie malnutrition (HCC) [E44.0]         Priority: Low    Diabetes [E11.9]         Priority: Low    Encounter for palliative care [Z51.5]      Abdominal mass [R19.00]      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         Plan:         Patient was discharged to inpatient hospice after hospice evaluation  There is no bed available at the facility  She will be kept her on the same bed with hospice status    Significant therapeutic interventions: See above notes    Significant Diagnostic Studies:   Labs / Micro:  CBC:   Lab Results   Component Value Date    WBC 22.9 07/11/2018    RBC 3.80 07/11/2018 HGB 9.9 07/11/2018    HCT 31.3 07/11/2018    MCV 82.4 07/11/2018    MCH 26.1 07/11/2018    MCHC 31.6 07/11/2018    RDW 18.1 07/11/2018     07/11/2018     BMP:    Lab Results   Component Value Date    GLUCOSE 179 07/11/2018    GLUCOSE 154 06/04/2012     07/11/2018    K 4.3 07/11/2018     07/11/2018    CO2 20 07/11/2018    ANIONGAP 10 07/11/2018    BUN 15 07/11/2018    CREATININE 0.74 07/11/2018    BUNCRER NOT REPORTED 07/11/2018    CALCIUM 8.0 07/11/2018    LABGLOM >60 07/11/2018    GFRAA >60 07/11/2018    GFR      07/11/2018    GFR NOT REPORTED 07/11/2018     HFP:    Lab Results   Component Value Date    PROT 6.1 07/10/2018     CMP:    Lab Results   Component Value Date    GLUCOSE 179 07/11/2018    GLUCOSE 154 06/04/2012     07/11/2018    K 4.3 07/11/2018     07/11/2018    CO2 20 07/11/2018    BUN 15 07/11/2018    CREATININE 0.74 07/11/2018    ANIONGAP 10 07/11/2018    ALKPHOS 142 07/10/2018    ALT 9 07/10/2018    AST 16 07/10/2018    BILITOT 0.49 07/10/2018    LABALBU 1.6 07/10/2018    LABALBU 4.4 06/04/2012    ALBUMIN 0.4 07/10/2018    LABGLOM >60 07/11/2018    GFRAA >60 07/11/2018    GFR      07/11/2018    GFR NOT REPORTED 07/11/2018    PROT 6.1 07/10/2018    CALCIUM 8.0 07/11/2018     PT/INR:  No results found for: PTINR  PTT:   Lab Results   Component Value Date    APTT 19.8 06/16/2018     FLP:    Lab Results   Component Value Date    CHOL 236 11/14/2016    TRIG 144 11/14/2016    HDL 52 11/14/2016     U/A:    Lab Results   Component Value Date    COLORU YELLOW 06/22/2018    TURBIDITY CLEAR 06/22/2018    SPECGRAV 1.009 06/22/2018    HGBUR NEGATIVE 06/22/2018    PHUR 5.0 06/22/2018    PROTEINU NEGATIVE 06/22/2018    GLUCOSEU NEGATIVE 06/22/2018    KETUA NEGATIVE 06/22/2018    BILIRUBINUR NEGATIVE 06/22/2018    UROBILINOGEN Normal 06/22/2018    NITRU NEGATIVE 06/22/2018    LEUKOCYTESUR NEGATIVE 06/22/2018     TSH:    Lab Results   Component Value Date    TSH 2.71 06/15/2018 Radiology:    Ct Abdomen Pelvis Wo Contrast Additional Contrast? None    Result Date: 7/9/2018  EXAMINATION: CT OF THE ABDOMEN AND PELVIS WITHOUT CONTRAST 7/9/2018 4:40 pm TECHNIQUE: CT of the abdomen and pelvis was performed without the administration of intravenous contrast. Multiplanar reformatted images are provided for review. Dose modulation, iterative reconstruction, and/or weight based adjustment of the mA/kV was utilized to reduce the radiation dose to as low as reasonably achievable. COMPARISON: CT abdomen and pelvis June 17, 2018. HISTORY: ORDERING SYSTEM PROVIDED HISTORY: Possible obstruction TECHNOLOGIST PROVIDED HISTORY: Additional Contrast?->None FINDINGS: Lower Chest: Small bilateral pleural effusions, similar the prior study. Left basilar atelectasis/scarring. Organs: Suboptimal evaluation due to non use of IV contrast.  Liver, spleen, gallbladder, pancreas and adrenal glands all appear unremarkable. Kidneys demonstrate no evidence of stone or hydronephrosis. Abdominal aorta appears normal in caliber with mild calcification. GI/Bowel: There appears to be thickening at the GE junction. Distal stomach appears unremarkable. Small bowel appears nondilated. No acute colonic abnormality is seen. Descending colon diverticulosis. Pelvis: Once again demonstrated is the patient's large pelvic mass which appears to have increased in size with interval worsening of the fluid component with a air-fluid level present. Given the lack of IV contrast, fistula cannot be totally excluded, however no air bubbles are seen within the urinary bladder. This mass appears to have a central area of calcification as seen on the prior study. The mass now measures 17.9 x 10.5 x 21.0 cm and appears to have the configuration of the uterus. However, per the patient's history, uterus has been removed. Ovaries are not clearly identified. Peritoneum/Retroperitoneum: No free air. No free fluid. No lymphadenopathy. Contrast?->Radiologist Recommendation FINDINGS: Chest: Mediastinum:  Normal cardiac size. Aorta enhances normally and is normal in caliber. The main pulmonary arteries are grossly normal.  No significant mediastinal, hilar or axillary lymphadenopathy. Thyroid gland shows no significant abnormalities. Esophagus shows no significant abnormalities. Small sliding hiatal hernia. Lungs/pleura: There is a flat 8 mm nodule within the anterior aspect of minor fissure or in the adjacent upper lobe. Likely benign. There is 3 mm subpleural nodule along the major fissure in the posterior right upper lobe, series 2, image 30. Lateral basal left lower lobe subpleural 4 mm nodule and a similar nodule along the major fissure in the inferior lingula at the lung bases. Haziness of the nodules attributed to respiratory motion artifacts. Trace bilateral pleural effusions. Soft Tissues/Bones: There is limited evaluation due to absence of oral contrast. Abdomen/Pelvis: Organs: The liver, spleen, pancreas, adrenal glands and gallbladder show no significant abnormalities. There is an indeterminate 1.4 cm mildly hypodense cortical lesion posterior lower pole right kidney. GI/Bowel: Small sliding hiatal hernia. Stomach grossly normal.  Small bowel shows no obstruction or focal lesions. The colon and rectum show no significant abnormalities. Pelvis: There is a mass centrally in the pelvis arising to the level of L5 which has the appearance of a uterus although there are additional abnormalities. On the assumption that this structure is a uterus, there is a large 7.5 cm central mass showing numerous calcifications, gas pockets and soft tissue component. This may represent large calcified leiomyoma with areas of degeneration. Further inferiorly there is fluid, gas and irregular nodular soft tissue density enlarging the cervix. Findings in the cervical region are concerning for neoplasm.  Peritoneum/Retroperitoneum: No significant HISTORY: ORDERING SYSTEM PROVIDED HISTORY: postmenopausal bleeding Sonographer indicates patient has had hysterectomy. FINDINGS: Measurements: Uterus:  Hysterectomy. Endometrial stripe:  Not applicable. Right Ovary:  Not visualized. Left Ovary:  3.8 x 2.9 x 3.2 cm. Ultrasound Findings: Uterus is absent. The cervix is in situ. There appears to be a solid slightly heterogeneous soft tissue mass in the mid pelvis close to the cervix measured at 8.7 x 7.0 x 7.4 cm. Etiology uncertain. Color Doppler imaging shows moderate increased vascularity. Recommend pelvic MRI for better characterization. Left Ovary: Right ovary is within normal limits. Spectral Doppler waveforms not obtained Right Ovary:  Not visualized Free Fluid: No evidence of free fluid. There appears to be 8.7 cm pelvic mass close to the cervix not optimally evaluated on this exam.  Recommend MRI female pelvis for further evaluation. Ct Abdomen Pelvis W Iv Contrast Additional Contrast? Radiologist Recommendation    Result Date: 6/18/2018  EXAMINATION: CT OF THE CHEST WITH CONTRAST; CT OF THE ABDOMEN AND PELVIS WITH CONTRAST 6/17/2018 10:55 am TECHNIQUE: CT of the chest was performed with the administration of intravenous contrast. Multiplanar reformatted images are provided for review. Dose modulation, iterative reconstruction, and/or weight based adjustment of the mA/kV was utilized to reduce the radiation dose to as low as reasonably achievable.; CT of the abdomen and pelvis was performed with the administration of intravenous contrast. Multiplanar reformatted images are provided for review. Dose modulation, iterative reconstruction, and/or weight based adjustment of the mA/kV was utilized to reduce the radiation dose to as low as reasonably achievable.  COMPARISON: Ultrasound 06/16/2018 HISTORY: ORDERING SYSTEM PROVIDED HISTORY: concern for malignancy, weight loss, decreased appetite, anemia of blood loss, s/p supracervical hysterectomy; ORDERING SYSTEM PROVIDED HISTORY: postmenopausal bleeding s/p supracervical hysterectomy, pelvic mass noted on ultrasound, concern for malignancy TECHNOLOGIST PROVIDED HISTORY: Additional Contrast?->Radiologist Recommendation FINDINGS: Chest: Mediastinum:  Normal cardiac size. Aorta enhances normally and is normal in caliber. The main pulmonary arteries are grossly normal.  No significant mediastinal, hilar or axillary lymphadenopathy. Thyroid gland shows no significant abnormalities. Esophagus shows no significant abnormalities. Small sliding hiatal hernia. Lungs/pleura: There is a flat 8 mm nodule within the anterior aspect of minor fissure or in the adjacent upper lobe. Likely benign. There is 3 mm subpleural nodule along the major fissure in the posterior right upper lobe, series 2, image 30. Lateral basal left lower lobe subpleural 4 mm nodule and a similar nodule along the major fissure in the inferior lingula at the lung bases. Haziness of the nodules attributed to respiratory motion artifacts. Trace bilateral pleural effusions. Soft Tissues/Bones: There is limited evaluation due to absence of oral contrast. Abdomen/Pelvis: Organs: The liver, spleen, pancreas, adrenal glands and gallbladder show no significant abnormalities. There is an indeterminate 1.4 cm mildly hypodense cortical lesion posterior lower pole right kidney. GI/Bowel: Small sliding hiatal hernia. Stomach grossly normal.  Small bowel shows no obstruction or focal lesions. The colon and rectum show no significant abnormalities. Pelvis: There is a mass centrally in the pelvis arising to the level of L5 which has the appearance of a uterus although there are additional abnormalities. On the assumption that this structure is a uterus, there is a large 7.5 cm central mass showing numerous calcifications, gas pockets and soft tissue component. This may represent large calcified leiomyoma with areas of degeneration.   Further inferiorly there is fluid, gas and irregular nodular soft tissue density enlarging the cervix. Findings in the cervical region are concerning for neoplasm. Peritoneum/Retroperitoneum: No significant lymphadenopathy. Trace amount of free fluid in the pelvis. Bones/Soft Tissues: Degenerative changes. No acute abnormality of the bones. The superficial soft tissues show no significant abnormalities. 1. There are three sub 5 mm nodules, one in the right upper lobe, lingula and the other lower lobe. Indeterminate. 2. A flat 4 mm nodule along the minor fissure is likely benign. 3. Trace bilateral pleural effusions. 4. A 7.5 cm rounded mass with calcifications, soft tissue and gas pockets located centrally in presumed uterus may represent calcified fibroid with degeneration less likely endometrial mass. See discussion above. 5. Heterogeneous enhancement and enlargement of presumed cervical region worrisome for neoplasm. RECOMMENDATIONS: Gynecologic consultation. Xr Chest Portable    Result Date: 7/11/2018  EXAMINATION: SINGLE XRAY VIEW OF THE CHEST 7/11/2018 5:38 am COMPARISON: July 9, 2018. HISTORY: ORDERING SYSTEM PROVIDED HISTORY: intubation TECHNOLOGIST PROVIDED HISTORY: Reason for exam:->intubation FINDINGS: Tip of endotracheal tube is at the entrance of the mainstem bronchus. This should be retracted approximately 4.8 cm. Tip of the nasogastric tube projects over gastroesophageal junction. Recommend advancement. Cardiac and mediastinal contours are enlarged but unchanged. Increased perihilar markings with prominence of interstitial lung markings. Small bilateral pleural effusions are present. Bibasilar pulmonary opacities are present. No evidence of pneumothorax. Mildly dilated bowel loops are noted within the left upper quadrant. 1. Tip of the endotracheal tube is at entrance of the mainstem bronchus. Consider retraction of about 4.8 cm, and repeat chest radiograph.  2. Tip of the nasogastric tube projects over the gastroesophageal junction. Recommend advancement. 3. Findings suggestive of worsening pulmonary edema. Small bilateral pleural effusions. Bibasilar pulmonary opacities may represent atelectasis, pneumonia, and/or aspiration. Recommend radiographic follow-up to complete resolution. The findings were sent to the Radiology Results [id number] at 6:03 am on 7/11/2018to be communicated to a licensed caregiver. Xr Chest Portable    Result Date: 7/11/2018  EXAMINATION: SINGLE XRAY VIEW OF THE CHEST 7/11/2018 7:10 am COMPARISON: Today at 3:57 a.m. and July 9, 2018 HISTORY: ORDERING SYSTEM PROVIDED HISTORY: check ET placement TECHNOLOGIST PROVIDED HISTORY: Reason for exam:->check ET placement FINDINGS: The endotracheal tube has been retracted now positioned 3 cm above the pablo. The enteric tube terminates in the proximal stomach, as seen previously. Bilateral interstitial and ground-glass opacities, lower lobe opacities and pleural effusions have increased. No pneumothorax identified. The osseous structures appear unchanged. Endotracheal tube has been retracted, now in appropriate position above the pablo. The enteric tube tip is in the region of the proximal stomach and could be further advanced 5 cm for optimal positioning. Interval worsening of bilateral airspace disease and pleural effusions. Pet Ct Skull Base To Mid Thigh    Result Date: 7/6/2018  EXAMINATION: WHOLE BODY PET/CT 7/6/2018 TECHNIQUE: Following IV injection of 13.38 mCi of F 18 -FDG, PET  tumor imaging was acquired from the base of the skull to the mid thighs. Computed tomography was used for purposes of attenuation correction and anatomic localization. Fusion imaging was utilized for interpretation. Uptake time 68 mins. Glucose level 111 mg/dl. COMPARISON: None HISTORY: ORDERING SYSTEM PROVIDED HISTORY: Malignant neoplasm of overlapping sites of female genital organs (HCC) Central pelvic mass. Diabetic patient. biologic radiation dose for this procedure:192.55 mGy/cm2.     1.  Large mass in the central pelvis, likely abnormal uterus obtaining large central area with air-fluid levels and floccular calcifications. Periphery of the lesion is hypermetabolic. Possibilities would include uterine/endometrial carcinoma with necrosis with abscessed uterine fibroid/pyomyoma not excluded. 2.   Diffusely increased hypermetabolic activity within the skeleton which may be due to marrow stimulation or marrow disorder. Case discussed with the ordering clinician at 1511 hours, 6 July 2018. RECOMMENDATIONS: Whole body radionuclide bone imaging is advised. Consultations:    Consults:     Final Specialist Recommendations/Findings:   IP CONSULT TO ONCOLOGY  IP CONSULT TO HOSPITALIST  IP CONSULT TO HEM/ONC  IP CONSULT TO RADIATION ONCOLOGY  IP CONSULT TO GYNECOLOGIC ONCOLOGY  IP CONSULT TO PALLIATIVE CARE  IP CONSULT TO CARDIOLOGY  IP CONSULT TO CRITICAL CARE  IP CONSULT TO CARDIOLOGY  IP CONSULT TO HOSPICE  IP CONSULT TO HOSPICE      The patient was seen and examined on day of discharge and this discharge summary is in conjunction with any daily progress note from day of discharge. Discharge plan:     Disposition: Inpatient hospice    Physician Follow Up:      Sonya Camarillo MD  [address] [place] [address]  [phone]             Requiring Further Evaluation/Follow Up POST HOSPITALIZATION/Incidental Findings: Inpatient hospice care    Diet: cardiac diet and diabetic diet    Activity: As tolerated        Discharge Medications:      Medication List      STOP taking these medications    aspirin EC 81 MG EC tablet     atorvastatin 40 MG tablet  Commonly known as:  LIPITOR     calcium carbonate 500 MG chewable tablet  Commonly known as:  ANTACID     docusate sodium 100 MG capsule  Commonly known as:  COLACE     glipiZIDE 10 MG tablet  Commonly known as:  GLUCOTROL     hydrochlorothiazide 25 MG

## 2018-07-13 NOTE — DISCHARGE SUMMARY
Katie Estrada 20    Discharge Summary     Patient ID: Raquel Gamez  :  1945   MRN: [id number]     ACCOUNT:  [de-identified]   Patient's PCP: Kate Mccallum MD  Admit Date: 2018   Discharge Date: 2018   Length of Stay: 1  Code Status:  DNR-CC  Admitting Physician: Abhijeet Petersen MD  Discharge Physician: Abhijeet Petersen MD     Active Discharge Diagnoses:     Hospital Problem Lists:  Principal Problem:    Pelvic cancer [facility])  Active Problems:    Vaginal bleeding    Anxiety    Diabetes    Hypertension    Abdominal mass    Hypotension    Cardiac arrest [facility])  Resolved Problems:    * No resolved hospital problems. *      Admission Condition:  stable     Discharged Condition: stable    Hospital Stay:     Hospital Course: The patient is a 71 y. o. female who Has been admitted as hospice patient since there are no inpatient hospice beds available at [facility]. She was admitted with uterine cancer after failed radiotherapy treatments. ,  Had a rough course in hospital had cardiorespiratory arrest, was intubated and successfully extubated, gynecology/oncology had recommended hospice consult since patient's condition had worsened despite of radiations  Family had  changed  code status to DNR CC and hospice had accepted the patient as inpatient  Remains stable overnight  States some medicine she got at night is making her forgetful in morning  Denies pain anywhere  She will be going to hospice today    Primary Problem  Pelvic cancer (HCC)-Worsening, failed radiotherapy    Active Hospital Problems    Diagnosis Date Noted    Pelvic cancer [facility]) [C76.3]      Priority: High    Vaginal bleeding [N93.9] 2018     Priority: High    Anxiety [F41.9]      Priority: Medium    Diabetes [E11.9]      Priority: Low    Hypotension [I95.9]     Cardiac arrest ([address] [I46.9]     Abdominal mass [R19.00]     Hypertension [I10]        Plan: 1. Patient will be going to hospice today      Significant therapeutic interventions:  Comfort care measures    Significant Diagnostic Studies:   Labs / Micro:  CBC:   Lab Results   Component Value Date    WBC 22.9 07/11/2018    RBC 3.80 07/11/2018    HGB 9.9 07/11/2018    HCT 31.3 07/11/2018    MCV 82.4 07/11/2018    MCH 26.1 07/11/2018    MCHC 31.6 07/11/2018    RDW 18.1 07/11/2018     07/11/2018     BMP:    Lab Results   Component Value Date    GLUCOSE 179 07/11/2018    GLUCOSE 154 06/04/2012     07/11/2018    K 4.3 07/11/2018     07/11/2018    CO2 20 07/11/2018    ANIONGAP 10 07/11/2018    BUN 15 07/11/2018    CREATININE 0.74 07/11/2018    BUNCRER NOT REPORTED 07/11/2018    CALCIUM 8.0 07/11/2018    LABGLOM >60 07/11/2018    GFRAA >60 07/11/2018    GFR      07/11/2018    GFR NOT REPORTED 07/11/2018     HFP:    Lab Results   Component Value Date    PROT 6.1 07/10/2018     CMP:    Lab Results   Component Value Date    GLUCOSE 179 07/11/2018    GLUCOSE 154 06/04/2012     07/11/2018    K 4.3 07/11/2018     07/11/2018    CO2 20 07/11/2018    BUN 15 07/11/2018    CREATININE 0.74 07/11/2018    ANIONGAP 10 07/11/2018    ALKPHOS 142 07/10/2018    ALT 9 07/10/2018    AST 16 07/10/2018    BILITOT 0.49 07/10/2018    LABALBU 1.6 07/10/2018    LABALBU 4.4 06/04/2012    ALBUMIN 0.4 07/10/2018    LABGLOM >60 07/11/2018    GFRAA >60 07/11/2018    GFR      07/11/2018    GFR NOT REPORTED 07/11/2018    PROT 6.1 07/10/2018    CALCIUM 8.0 07/11/2018     PT/INR:  No results found for: PTINR  PTT:   Lab Results   Component Value Date    APTT 19.8 06/16/2018     FLP:    Lab Results   Component Value Date    CHOL 236 11/14/2016    TRIG 144 11/14/2016    HDL 52 11/14/2016     U/A:    Lab Results   Component Value Date    COLORU YELLOW 06/22/2018    TURBIDITY CLEAR 06/22/2018    SPECGRAV 1.009 06/22/2018    HGBUR NEGATIVE 06/22/2018    PHUR 5.0 06/22/2018    PROTEINU NEGATIVE 06/22/2018    GLUCOSEU NEGATIVE 06/22/2018    KETUA NEGATIVE 06/22/2018    BILIRUBINUR NEGATIVE 06/22/2018    UROBILINOGEN Normal 06/22/2018    NITRU NEGATIVE 06/22/2018    LEUKOCYTESUR NEGATIVE 06/22/2018     TSH:    Lab Results   Component Value Date    TSH 2.71 06/15/2018         Radiology:    Ct Abdomen Pelvis Wo Contrast Additional Contrast? None    Result Date: 7/9/2018  EXAMINATION: CT OF THE ABDOMEN AND PELVIS WITHOUT CONTRAST 7/9/2018 4:40 pm TECHNIQUE: CT of the abdomen and pelvis was performed without the administration of intravenous contrast. Multiplanar reformatted images are provided for review. Dose modulation, iterative reconstruction, and/or weight based adjustment of the mA/kV was utilized to reduce the radiation dose to as low as reasonably achievable. COMPARISON: CT abdomen and pelvis June 17, 2018. HISTORY: ORDERING SYSTEM PROVIDED HISTORY: Possible obstruction TECHNOLOGIST PROVIDED HISTORY: Additional Contrast?->None FINDINGS: Lower Chest: Small bilateral pleural effusions, similar the prior study. Left basilar atelectasis/scarring. Organs: Suboptimal evaluation due to non use of IV contrast.  Liver, spleen, gallbladder, pancreas and adrenal glands all appear unremarkable. Kidneys demonstrate no evidence of stone or hydronephrosis. Abdominal aorta appears normal in caliber with mild calcification. GI/Bowel: There appears to be thickening at the GE junction. Distal stomach appears unremarkable. Small bowel appears nondilated. No acute colonic abnormality is seen. Descending colon diverticulosis. Pelvis: Once again demonstrated is the patient's large pelvic mass which appears to have increased in size with interval worsening of the fluid component with a air-fluid level present. Given the lack of IV contrast, fistula cannot be totally excluded, however no air bubbles are seen within the urinary bladder. This mass appears to have a central area of calcification as seen on the prior study.   The mass now measures 17.9 x 10.5 x 21.0 cm and appears to have the configuration of the uterus. However, per the patient's history, uterus has been removed. Ovaries are not clearly identified. Peritoneum/Retroperitoneum: No free air. No free fluid. No lymphadenopathy. Bones/Soft Tissues: Abdominal wall demonstrates diffuse body wall anasarca. No fluid collection is seen. Osseous structures demonstrate degenerative changes. No aggressive bony lesions are clearly identified. *No evidence of small bowel obstruction. *Interval enlargement of the patient's pelvic mass now measuring 17.9 x 10.5 x 21.0 cm with interval progression of the fluid component with an air-fluid level seen. Findings are consistent the patient's known malignancy. Superimposed infectious process or possibly Fistula development cannot be totally excluded. *Small bilateral pleural effusions. Xr Chest Standard (2 Vw)    Result Date: 7/9/2018  EXAMINATION: TWO VIEWS OF THE CHEST 7/9/2018 4:06 pm COMPARISON: Chest Monica 15, 2018. HISTORY: ORDERING SYSTEM PROVIDED HISTORY: SOB TECHNOLOGIST PROVIDED HISTORY: Reason for exam:->SOB FINDINGS: Heart appears normal in size. Left lower lobe airspace disease is seen. Right lower lobe atelectasis. No pneumothorax, pleural effusion or free air. *Left lower lobe airspace disease. Right lower lobe atelectasis. Repeat chest x-ray after therapy is recommended to confirm response of therapy and resolution. Xr Chest Standard (2 Vw)    Result Date: 6/15/2018  EXAMINATION: TWO VIEWS OF THE CHEST 6/15/2018 10:14 pm COMPARISON: None. HISTORY: ORDERING SYSTEM PROVIDED HISTORY: r/o infectious process TECHNOLOGIST PROVIDED HISTORY: Reason for exam:->r/o infectious process FINDINGS: Cardiomediastinal silhouette and pulmonary vasculature are within normal limits. No focal airspace consolidation, pneumothorax, or pleural effusion. No free air beneath the diaphragm. No acute osseous abnormality.      No acute intrathoracic process. Xr Abdomen (kub) (single Ap View)    Result Date: 7/11/2018  EXAMINATION: SINGLE SUPINE XRAY VIEW(S) OF THE ABDOMEN 7/11/2018 5:39 am COMPARISON: CT abdomen and pelvis on 07/09/2018. HISTORY: ORDERING SYSTEM PROVIDED HISTORY: rapid response TECHNOLOGIST PROVIDED HISTORY: Reason for exam:->rapid response FINDINGS: Tip of the nasogastric tube projects over the gastroesophageal junction. Recommend advancement. Right femoral catheter is noted. Multiple dilated bowel loops are seen within abdomen and pelvis, with markedly dilated bowel loops within the left hemiabdomen. This may represent gaseous distention of the stomach. Large partially calcified pelvic mass is noted. 1. Tip of the nasogastric tube projects over the gastroesophageal junction. Recommend advancement. 2. Multiple dilated bowel loops are seen within abdomen and pelvis. Findings may represent partial bowel obstruction, or ileus. Recommend follow-up examination. 3. Large pelvic mass. Please see separate CT abdomen and pelvis report. Ct Chest W Contrast    Result Date: 6/18/2018  EXAMINATION: CT OF THE CHEST WITH CONTRAST; CT OF THE ABDOMEN AND PELVIS WITH CONTRAST 6/17/2018 10:55 am TECHNIQUE: CT of the chest was performed with the administration of intravenous contrast. Multiplanar reformatted images are provided for review. Dose modulation, iterative reconstruction, and/or weight based adjustment of the mA/kV was utilized to reduce the radiation dose to as low as reasonably achievable.; CT of the abdomen and pelvis was performed with the administration of intravenous contrast. Multiplanar reformatted images are provided for review. Dose modulation, iterative reconstruction, and/or weight based adjustment of the mA/kV was utilized to reduce the radiation dose to as low as reasonably achievable.  COMPARISON: Ultrasound 06/16/2018 HISTORY: ORDERING SYSTEM PROVIDED HISTORY: concern for malignancy, weight loss, decreased appetite, anemia areas of degeneration. Further inferiorly there is fluid, gas and irregular nodular soft tissue density enlarging the cervix. Findings in the cervical region are concerning for neoplasm. Peritoneum/Retroperitoneum: No significant lymphadenopathy. Trace amount of free fluid in the pelvis. Bones/Soft Tissues: Degenerative changes. No acute abnormality of the bones. The superficial soft tissues show no significant abnormalities. 1. There are three sub 5 mm nodules, one in the right upper lobe, lingula and the other lower lobe. Indeterminate. 2. A flat 4 mm nodule along the minor fissure is likely benign. 3. Trace bilateral pleural effusions. 4. A 7.5 cm rounded mass with calcifications, soft tissue and gas pockets located centrally in presumed uterus may represent calcified fibroid with degeneration less likely endometrial mass. See discussion above. 5. Heterogeneous enhancement and enlargement of presumed cervical region worrisome for neoplasm. RECOMMENDATIONS: Gynecologic consultation. Mri Pelvis W Wo Contrast    Result Date: 6/23/2018  EXAMINATION: MRI OF THE PELVIS WITHOUT AND WITH CONTRAST, 6/19/2018 5:08 pm TECHNIQUE: Multiplanar multisequence MRI of the pelvis was performed without and with the administration of intravenous contrast. COMPARISON: 06/17/2018 CT abdomen and pelvis and 06/16/2018 pelvic ultrasound HISTORY: ORDERING SYSTEM PROVIDED HISTORY: ADNEXAL MASS, COMPLEX OR SOLID ON US, POST-MENOPAUSE, F/U Complex partially calcified mass with air noted in the pelvis on prior CT examination. Status post partial hysterectomy. FINDINGS: As seen on the prior CT examination there is a large mass/abnormal collection at the cervical cuff measuring approximately 8.3 x 7.9 x 7.8 cm in size. The mass demonstrates internal calcifications and multiple foci of air. There is a left lateral extension of the complex collection measuring approximately 3.5 x 3.3 x 5.4 cm with possible air-fluid level.   There is no evidence of enhancement of this area on postcontrast images. Findings suggest an abnormal collection within the residual endometrial canal of the cervical cuff with endometrial calcifications. Findings are highly suspicious for an endometrial malignancy with possible superinfection/abscess formation within the residual endometrial canal.  The complex abnormal fluid directly communicates with the endocervical canal which is irregular in shape. The endocervical canal measures approximately 8 mm in thickness. There is a large irregular enhancing mass involving the cervix measuring approximately 6 cm in SI dimension and up to 3.5 cm in thickness. This demonstrates abnormal enhancement with extension of the enhancement into the posterolateral soft tissues of the deep pelvis. Findings suggest a cervical malignancy. There is no definite evidence of inguinal lymphadenopathy. No evidence of pelvic sidewall or retroperitoneal lymphadenopathy noted within the limits of this examination. There is a small amount of abnormal free fluid in the pelvis. No definite evidence of abnormal fistulous connection or abnormality of the rectum or sigmoid colon. The bladder is grossly unremarkable. The visualized osseous structures demonstrate no acute abnormality. Irregular enhancing mass involving the cervix highly suspicious for cervical malignancy. Complex nonenhancing collection of fluid, gas and calcifications involving the cervical cuff and likely dilating the remaining endocervical/endometrial canal.  This may be secondary to an endometrial malignancy. This may also be secondary to abscess/superinfection of complex endocervical/endometrial fluid secondary to cervical stenosis from the large cervical mass. Abnormal enhancement extending into the deep left posterolateral soft tissues of the pelvis may represent extension of the cervical mass. No definite evidence of pelvic lymphadenopathy.  Recommend consultation with low as reasonably achievable. COMPARISON: Ultrasound 06/16/2018 HISTORY: ORDERING SYSTEM PROVIDED HISTORY: concern for malignancy, weight loss, decreased appetite, anemia of blood loss, s/p supracervical hysterectomy; ORDERING SYSTEM PROVIDED HISTORY: postmenopausal bleeding s/p supracervical hysterectomy, pelvic mass noted on ultrasound, concern for malignancy TECHNOLOGIST PROVIDED HISTORY: Additional Contrast?->Radiologist Recommendation FINDINGS: Chest: Mediastinum:  Normal cardiac size. Aorta enhances normally and is normal in caliber. The main pulmonary arteries are grossly normal.  No significant mediastinal, hilar or axillary lymphadenopathy. Thyroid gland shows no significant abnormalities. Esophagus shows no significant abnormalities. Small sliding hiatal hernia. Lungs/pleura: There is a flat 8 mm nodule within the anterior aspect of minor fissure or in the adjacent upper lobe. Likely benign. There is 3 mm subpleural nodule along the major fissure in the posterior right upper lobe, series 2, image 30. Lateral basal left lower lobe subpleural 4 mm nodule and a similar nodule along the major fissure in the inferior lingula at the lung bases. Haziness of the nodules attributed to respiratory motion artifacts. Trace bilateral pleural effusions. Soft Tissues/Bones: There is limited evaluation due to absence of oral contrast. Abdomen/Pelvis: Organs: The liver, spleen, pancreas, adrenal glands and gallbladder show no significant abnormalities. There is an indeterminate 1.4 cm mildly hypodense cortical lesion posterior lower pole right kidney. GI/Bowel: Small sliding hiatal hernia. Stomach grossly normal.  Small bowel shows no obstruction or focal lesions. The colon and rectum show no significant abnormalities. Pelvis: There is a mass centrally in the pelvis arising to the level of L5 which has the appearance of a uterus although there are additional abnormalities.   On the assumption that this structure is a uterus, there is a large 7.5 cm central mass showing numerous calcifications, gas pockets and soft tissue component. This may represent large calcified leiomyoma with areas of degeneration. Further inferiorly there is fluid, gas and irregular nodular soft tissue density enlarging the cervix. Findings in the cervical region are concerning for neoplasm. Peritoneum/Retroperitoneum: No significant lymphadenopathy. Trace amount of free fluid in the pelvis. Bones/Soft Tissues: Degenerative changes. No acute abnormality of the bones. The superficial soft tissues show no significant abnormalities. 1. There are three sub 5 mm nodules, one in the right upper lobe, lingula and the other lower lobe. Indeterminate. 2. A flat 4 mm nodule along the minor fissure is likely benign. 3. Trace bilateral pleural effusions. 4. A 7.5 cm rounded mass with calcifications, soft tissue and gas pockets located centrally in presumed uterus may represent calcified fibroid with degeneration less likely endometrial mass. See discussion above. 5. Heterogeneous enhancement and enlargement of presumed cervical region worrisome for neoplasm. RECOMMENDATIONS: Gynecologic consultation. Xr Chest Portable    Result Date: 7/11/2018  EXAMINATION: SINGLE XRAY VIEW OF THE CHEST 7/11/2018 5:38 am COMPARISON: July 9, 2018. HISTORY: ORDERING SYSTEM PROVIDED HISTORY: intubation TECHNOLOGIST PROVIDED HISTORY: Reason for exam:->intubation FINDINGS: Tip of endotracheal tube is at the entrance of the mainstem bronchus. This should be retracted approximately 4.8 cm. Tip of the nasogastric tube projects over gastroesophageal junction. Recommend advancement. Cardiac and mediastinal contours are enlarged but unchanged. Increased perihilar markings with prominence of interstitial lung markings. Small bilateral pleural effusions are present. Bibasilar pulmonary opacities are present. No evidence of pneumothorax.  Mildly dilated bowel loops are noted time 68 mins. Glucose level 111 mg/dl. COMPARISON: None HISTORY: ORDERING SYSTEM PROVIDED HISTORY: Malignant neoplasm of overlapping sites of female genital organs (HCC) Central pelvic mass. Diabetic patient. FINDINGS: HEAD/NECK: Normal physiologic uptake of FDG is noted in the brain parenchyma and salivary glands. No abnormal hypermetabolic FDG avid lesions are present in the cervical lymph node chain or supraclavicular areas. CHEST: Normal physiologic uptake of FDG is noted in the great vessels and left ventricular myocardium. No abnormal FDG avid lesions are noted in the mediastinum, hilar areas or lung fields. The patient has bilateral pleural effusions, small, with SUV maximum of 1.4. ABDOMEN/PELVIS: Normal physiologic uptake of FDG is noted within the liver, spleen, and urinary collecting systems to include the kidneys, ureters and bladder. No abnormal FDG avidity within the bowel is noted. The patient has a large midline pelvic mass containing calcifications and an air-fluid level. This mass demonstrates no central abnormal FDG avidity although FDG uptake in the wall of the mass is mild with SUV maximum of 5.2, suspicious for neoplasia with central necrosis. The area of central calcification in the air-fluid level is 9.9 x 13.4 cm. No adnexal hypermetabolic activity is noted. Findings are likely related to uterine neoplasia with possible necrotic fibroids. Possibility of atypical uterine infectious process is not excluded. In the presacral space a mass is present likely stool-filled colon with SUV maximum of 2.6. No abnormal FDG avid lesions are noted within the groin. BONES/SOFT TISSUE: Diffuse increased FDG uptake is noted throughout the bone marrow including the pelvis, lumbar spine, included thoracic spine, and humeri. If the patient has received chemotherapy treatment, this may represent marrow stimulation from treatment. Otherwise, marrow disorder is not excluded.  INCIDENTAL CT FINDINGS:

## 2018-07-13 NOTE — PROGRESS NOTES
Past Medical History:   has a past medical history of Anxiety; Diabetes ([facility] [address] Hyperlipidemia; Hypertension; Osteoarthritis; and Pelvic cancer ([facility] [address] Social History:   reports that she has never smoked. She does not have any smokeless tobacco history on file. She reports that she does not drink alcohol or use drugs. Family History:   Family History   Problem Relation Age of Onset    Heart Disease Mother     Cancer Father        Vitals:  BP (!) 158/78   Pulse 114   Temp 97.9 °F (36.6 °C) (Oral)   Resp 14   SpO2 (!) 89%   Temp (24hrs), Av.4 °F (36.9 °C), Min:97.9 °F (36.6 °C), Max:98.8 °F (37.1 °C)    Recent Labs      18   0326  18   0441  18   0905  18   1342   POCGLU  141*  528*  199*  143*       I/O (24Hr): Intake/Output Summary (Last 24 hours) at 18 0751  Last data filed at 18 1811   Gross per 24 hour   Intake              129 ml   Output                0 ml   Net              129 ml       Labs:    Hematology:  Recent Labs      07/10/18   2241  18   0403  18   1221   WBC   --   29.4*  22.9*   HGB  7.8*  7.5*  9.9*   HCT   --   24.9*  31.3*   PLT   --   315  361     Chemistry:  Recent Labs      07/10/18   2241  18   0403  18   0441  18   1221   NA   --   131*   --   135   K  4.2  5.7*   --   4.3   CL   --   99   --   105   CO2   --   18*   --   20   GLUCOSE   --   136*   --   179*   BUN   --   11   --   15   CREATININE   --   0.63  0.67  0.74   MG   --   2.0   --    --    ANIONGAP   --   14   --   10   LABGLOM   --   >60  >60  >60   GFRAA   --   >60   --   >60   CALCIUM   --   7.8*   --   8.0*   PHOS   --   3.8   --    --      No results for input(s): PROT, LABALBU, LABA1C, K5TKGOM, L9PTMWF, FT4, TSH, AST, ALT, LDH, GGT, ALKPHOS, BILITOT, BILIDIR, AMMONIA, AMYLASE, LIPASE, LACTATE, CHOL, HDL, LDLCHOLESTEROL, CHOLHDLRATIO, TRIG, VLDL, PHENYTOIN, PHENYF in the last 72 hours.       Lab Results   Component Value Date/Time SPECIAL NOT REPORTED 06/23/2018 10:20 AM    SPECIAL NOT REPORTED 06/23/2018 10:20 AM     Lab Results   Component Value Date/Time    CULTURE (A) 06/23/2018 10:20 AM     BACTEROIDES DISTASONIS BETA LACTAMASE NEGATIVE SCANT GROWTH    CULTURE (A) 06/23/2018 10:20 AM     PREVOTELLA (BACTEROIDES) MELANINOGENICA BETA LACTAMASE POSITIVE SCANT GROWTH    CULTURE MICROMONAS MICROS SCANT GROWTH (A) 06/23/2018 10:20 AM    CULTURE NO AEROBIC ORGANISMS ISOLATED (A) 06/23/2018 10:20 AM       Lab Results   Component Value Date    POCPH 7.299 07/11/2018    POCPCO2 50.0 07/11/2018    POCPO2 123.4 07/11/2018    POCHCO3 24.6 07/11/2018    NBEA 2 07/11/2018    PBEA NOT REPORTED 07/11/2018    KDV3HRM 26 07/11/2018    DCNL6CQG 98 07/11/2018    FIO2 100.0 07/11/2018       Radiology:    x    Physical Examination:        General appearance:  alert, cooperative and no distress  Mental Status:  oriented to person, place and time and Anxious  Lungs:  clear to auscultation bilaterally, normal effort  Heart:  regular rate and rhythm, no murmur  Abdomen:  soft, nontender, nondistended, normal bowel sounds, vague mass lower abdomen,  Extremities:  no edema, redness, tenderness in the calves  Skin:  no gross lesions, rashes, induration    Assessment:        Primary Problem  Pelvic cancer (HCC)-Worsening, failed radiotherapy    Active Hospital Problems    Diagnosis Date Noted    Pelvic cancer [facility]) [C76.3]      Priority: High    Vaginal bleeding [N93.9] 06/16/2018     Priority: High    Anxiety [F41.9]      Priority: Medium    Diabetes [E11.9]      Priority: Low    Hypotension [I95.9]     Cardiac arrest ([facility] [address] [I46.9]     Abdominal mass [R19.00]     Hypertension [I10]        Plan:        1.  Patient will be going to hospice today    Yon Goddard MD  7/13/2018  7:51 AM

## 2018-07-13 NOTE — FLOWSHEET NOTE
Visit during rounds. Pt was laying in bed, room dark, TV on. Pt responded to greeting, and immediately expressed her agitation to  about what she considered to be \"poor service\" today. She was frustrated that she hadn't seen any nurses in several hours. Pt's breakfast and drink at bedside remained largely untouched. When asked if she was hungry, she responded that \"I can't eat that stuff\", and asked for water. Pt declined offer of prayer but asked  to see what could be done about her situation, thanking me for listening to her.  spoke to RN, who confirmed that they were in the room less than two hours prior, and that the Pt had not seemed agitated at that time. RN will follow up with Pt, who is scheduled to be transferred to a hospice/nursing facility. Chaplains should remain available for emotional/spiritual support upon request.       07/13/18 0910   Encounter Summary   Services provided to: Patient   Referral/Consult From: Rounding   Support System Unknown   Continue Visiting (07/13/18)   Complexity of Encounter Low   Length of Encounter 15 minutes   Routine   Type Initial   Assessment Approachable; Anxious; Angry   Intervention Active listening;Explored feelings, thoughts, concerns   Outcome Expressed gratitude;Comfort

## 2018-07-14 PROBLEM — C54.9: Status: ACTIVE | Noted: 2018-01-01

## 2018-07-24 NOTE — TELEPHONE ENCOUNTER
PATIENT'S SISTER, CAMILLE CALLED TO SAY THAT PATIENT RECEIVED A NO SHOW LETTER. HOWEVER, THE PATIENT HAS BEEN IN [facility] SINCE July 12TH, 2018.

## 2018-08-09 PROBLEM — R77.8 ELEVATED TROPONIN: Status: RESOLVED | Noted: 2018-01-01 | Resolved: 2018-08-09

## 2020-12-29 NOTE — TELEPHONE ENCOUNTER
29-Dec-2020 22:41 Upon reviewing EPIC noted PET/CT scan not completed. Spoke with [address] [place] [address], notified PET/CT scanner went down. Spoke with [facility], notified need to reschedule PET/CT scan asa. Pt rescheduled for 7/6/18 @ [facility] to arrive @ 1100 for 1130 scan. Spoke with Dr. Tamika Sharif, updated on PET/CT yesterday unable to be completed r/t scanner down. Notified Dr. Elias Painter PET/CT scan rescheduled for 7/6/18. Dr. Elias Painter request writer speak with Jasmyn [address] dosimetrist, r/t XRT planning & requested inform Darshan Zuniga XRT to start 7/5/18. Spoke with Darshan Zuniga, updated on conversation with Dr. Elias Painter. Spoke with Fleet Cons, pt's sister, updated on rescheduled PET/CT scan date/time/location & instructed will contact Beth De La Cruz, [facility] , to assist with transportation. Easton Martínez XRT to start 7/5/18 & will receive call from [facility]. or  RTT's with tx start time. Inquired if [employer] able to transport pt 7/5/18 if unable to obtain transportation r/t tomorrow holiday. Fleet Cons stated may be able to find transportation. Spoke with [facility], updated on pt, PET/CT scan 7/6/18, Dr. Mortimer Cleveland orders to start XRT 7/5/18, & pt will need transportation for daily XRT. Attempted to contact Roger, no answer, VM left requesting arrange transportation for PET/CT scan 7/6/18 @ [facility] with pt to arrive @ 1100. Will continue to follow.

## (undated) DEVICE — GLOVE ORANGE PI 7   MSG9070

## (undated) DEVICE — SVMMC GYN MIN PK

## (undated) DEVICE — APPLICATOR FIBER TIP 16 IN CELLULOSE HD SWAB CHEVRON SCPET

## (undated) DEVICE — GLOVE SURG SZ 6 THK91MIL LTX FREE SYN POLYISOPRENE ANTI

## (undated) DEVICE — GLOVE ORANGE PI 7 1/2   MSG9075

## (undated) DEVICE — SOLUTION SURG PREP POV IOD 7.5% 4 OZ

## (undated) DEVICE — KENDALL SCD EXPRESS SLEEVES, KNEE LENGTH, MEDIUM: Brand: KENDALL SCD

## (undated) DEVICE — CYSTO/BLADDER IRRIGATION SET, REGULATING CLAMP

## (undated) DEVICE — CONTAINER,SPECIMEN,4OZ,OR STRL: Brand: MEDLINE

## (undated) DEVICE — 1016 S-DRAPE IRRIG POUCH 10/BOX: Brand: STERI-DRAPE™

## (undated) DEVICE — PREP SOL PVP IODINE 4%  4 OZ/BTL

## (undated) DEVICE — DRAPE,REIN 53X77,STERILE: Brand: MEDLINE

## (undated) DEVICE — TUBING, SUCTION, 9/32" X 20', STRAIGHT: Brand: MEDLINE INDUSTRIES, INC.